# Patient Record
Sex: MALE | Race: WHITE | NOT HISPANIC OR LATINO | Employment: OTHER
[De-identification: names, ages, dates, MRNs, and addresses within clinical notes are randomized per-mention and may not be internally consistent; named-entity substitution may affect disease eponyms.]

---

## 2017-03-29 ENCOUNTER — RECORDS - HEALTHEAST (OUTPATIENT)
Dept: ADMINISTRATIVE | Facility: OTHER | Age: 62
End: 2017-03-29

## 2017-07-17 ENCOUNTER — OFFICE VISIT - HEALTHEAST (OUTPATIENT)
Dept: FAMILY MEDICINE | Facility: CLINIC | Age: 62
End: 2017-07-17

## 2017-07-17 DIAGNOSIS — K21.9 ESOPHAGEAL REFLUX: ICD-10-CM

## 2017-07-17 DIAGNOSIS — R73.9 HYPERGLYCEMIA: ICD-10-CM

## 2017-07-17 DIAGNOSIS — Z00.00 ROUTINE GENERAL MEDICAL EXAMINATION AT A HEALTH CARE FACILITY: ICD-10-CM

## 2017-07-17 DIAGNOSIS — N50.89 TESTICULAR SWELLING, RIGHT: ICD-10-CM

## 2017-07-17 DIAGNOSIS — R79.89 ELEVATED LFTS: ICD-10-CM

## 2017-07-17 LAB
CHOLEST SERPL-MCNC: 198 MG/DL
FASTING STATUS PATIENT QL REPORTED: YES
HBA1C MFR BLD: 6 % (ref 3.5–6)
HDLC SERPL-MCNC: 59 MG/DL
LDLC SERPL CALC-MCNC: 122 MG/DL
PSA SERPL-MCNC: 0.5 NG/ML (ref 0–4.5)
TRIGL SERPL-MCNC: 83 MG/DL

## 2017-07-17 ASSESSMENT — MIFFLIN-ST. JEOR: SCORE: 1697.08

## 2017-07-25 ENCOUNTER — COMMUNICATION - HEALTHEAST (OUTPATIENT)
Dept: LAB | Facility: CLINIC | Age: 62
End: 2017-07-25

## 2017-07-25 DIAGNOSIS — E87.5 HYPERKALEMIA: ICD-10-CM

## 2017-07-26 ENCOUNTER — AMBULATORY - HEALTHEAST (OUTPATIENT)
Dept: LAB | Facility: CLINIC | Age: 62
End: 2017-07-26

## 2017-07-26 DIAGNOSIS — E87.5 HYPERKALEMIA: ICD-10-CM

## 2017-08-01 ENCOUNTER — RECORDS - HEALTHEAST (OUTPATIENT)
Dept: ADMINISTRATIVE | Facility: OTHER | Age: 62
End: 2017-08-01

## 2018-03-05 ENCOUNTER — OFFICE VISIT - HEALTHEAST (OUTPATIENT)
Dept: FAMILY MEDICINE | Facility: CLINIC | Age: 63
End: 2018-03-05

## 2018-03-05 DIAGNOSIS — N43.3 RIGHT HYDROCELE: ICD-10-CM

## 2018-03-05 DIAGNOSIS — Z01.818 PREOPERATIVE EXAMINATION: ICD-10-CM

## 2018-03-05 LAB
ATRIAL RATE - MUSE: 63 BPM
DIASTOLIC BLOOD PRESSURE - MUSE: NORMAL MMHG
HGB BLD-MCNC: 15.3 G/DL (ref 14–18)
INTERPRETATION ECG - MUSE: NORMAL
P AXIS - MUSE: 60 DEGREES
PR INTERVAL - MUSE: 130 MS
QRS DURATION - MUSE: 96 MS
QT - MUSE: 380 MS
QTC - MUSE: 388 MS
R AXIS - MUSE: 29 DEGREES
SYSTOLIC BLOOD PRESSURE - MUSE: NORMAL MMHG
T AXIS - MUSE: 21 DEGREES
VENTRICULAR RATE- MUSE: 63 BPM

## 2018-03-05 ASSESSMENT — MIFFLIN-ST. JEOR: SCORE: 1700.25

## 2018-03-08 ASSESSMENT — MIFFLIN-ST. JEOR: SCORE: 1659.43

## 2018-03-11 ENCOUNTER — ANESTHESIA - HEALTHEAST (OUTPATIENT)
Dept: SURGERY | Facility: CLINIC | Age: 63
End: 2018-03-11

## 2018-03-12 ENCOUNTER — SURGERY - HEALTHEAST (OUTPATIENT)
Dept: SURGERY | Facility: CLINIC | Age: 63
End: 2018-03-12

## 2018-06-18 ENCOUNTER — OFFICE VISIT - HEALTHEAST (OUTPATIENT)
Dept: FAMILY MEDICINE | Facility: CLINIC | Age: 63
End: 2018-06-18

## 2018-06-18 DIAGNOSIS — Z00.00 ROUTINE GENERAL MEDICAL EXAMINATION AT A HEALTH CARE FACILITY: ICD-10-CM

## 2018-06-18 DIAGNOSIS — R73.9 HYPERGLYCEMIA: ICD-10-CM

## 2018-06-18 DIAGNOSIS — R53.83 FATIGUE: ICD-10-CM

## 2018-06-18 LAB
ALBUMIN SERPL-MCNC: 3.8 G/DL (ref 3.5–5)
ALP SERPL-CCNC: 71 U/L (ref 45–120)
ALT SERPL W P-5'-P-CCNC: 14 U/L (ref 0–45)
ANION GAP SERPL CALCULATED.3IONS-SCNC: 9 MMOL/L (ref 5–18)
AST SERPL W P-5'-P-CCNC: 14 U/L (ref 0–40)
BILIRUB SERPL-MCNC: 0.5 MG/DL (ref 0–1)
BUN SERPL-MCNC: 13 MG/DL (ref 8–22)
CALCIUM SERPL-MCNC: 9.6 MG/DL (ref 8.5–10.5)
CHLORIDE BLD-SCNC: 107 MMOL/L (ref 98–107)
CHOLEST SERPL-MCNC: 185 MG/DL
CO2 SERPL-SCNC: 26 MMOL/L (ref 22–31)
CREAT SERPL-MCNC: 0.9 MG/DL (ref 0.7–1.3)
ERYTHROCYTE [DISTWIDTH] IN BLOOD BY AUTOMATED COUNT: 12.9 % (ref 11–14.5)
FASTING STATUS PATIENT QL REPORTED: YES
GFR SERPL CREATININE-BSD FRML MDRD: >60 ML/MIN/1.73M2
GLUCOSE BLD-MCNC: 101 MG/DL (ref 70–125)
HBA1C MFR BLD: 5.9 % (ref 3.5–6)
HCT VFR BLD AUTO: 45.8 % (ref 40–54)
HDLC SERPL-MCNC: 51 MG/DL
HGB BLD-MCNC: 15.6 G/DL (ref 14–18)
LDLC SERPL CALC-MCNC: 119 MG/DL
MCH RBC QN AUTO: 32 PG (ref 27–34)
MCHC RBC AUTO-ENTMCNC: 34 G/DL (ref 32–36)
MCV RBC AUTO: 94 FL (ref 80–100)
PLATELET # BLD AUTO: 300 THOU/UL (ref 140–440)
PMV BLD AUTO: 6.9 FL (ref 7–10)
POTASSIUM BLD-SCNC: 5.3 MMOL/L (ref 3.5–5)
PROT SERPL-MCNC: 6.6 G/DL (ref 6–8)
PSA SERPL-MCNC: 0.5 NG/ML (ref 0–4.5)
RBC # BLD AUTO: 4.86 MILL/UL (ref 4.4–6.2)
SODIUM SERPL-SCNC: 142 MMOL/L (ref 136–145)
TRIGL SERPL-MCNC: 74 MG/DL
WBC: 6.3 THOU/UL (ref 4–11)

## 2018-06-18 ASSESSMENT — MIFFLIN-ST. JEOR: SCORE: 1695.72

## 2018-10-01 ENCOUNTER — AMBULATORY - HEALTHEAST (OUTPATIENT)
Dept: FAMILY MEDICINE | Facility: CLINIC | Age: 63
End: 2018-10-01

## 2018-10-02 ENCOUNTER — AMBULATORY - HEALTHEAST (OUTPATIENT)
Dept: NURSING | Facility: CLINIC | Age: 63
End: 2018-10-02

## 2018-10-09 ENCOUNTER — OFFICE VISIT - HEALTHEAST (OUTPATIENT)
Dept: FAMILY MEDICINE | Facility: CLINIC | Age: 63
End: 2018-10-09

## 2018-10-09 DIAGNOSIS — R10.13 ABDOMINAL PAIN, EPIGASTRIC: ICD-10-CM

## 2018-10-09 LAB
ALBUMIN SERPL-MCNC: 3.9 G/DL (ref 3.5–5)
ALP SERPL-CCNC: 148 U/L (ref 45–120)
ALT SERPL W P-5'-P-CCNC: 178 U/L (ref 0–45)
ANION GAP SERPL CALCULATED.3IONS-SCNC: 10 MMOL/L (ref 5–18)
AST SERPL W P-5'-P-CCNC: 47 U/L (ref 0–40)
BILIRUB SERPL-MCNC: 0.6 MG/DL (ref 0–1)
BUN SERPL-MCNC: 11 MG/DL (ref 8–22)
CALCIUM SERPL-MCNC: 9.6 MG/DL (ref 8.5–10.5)
CHLORIDE BLD-SCNC: 107 MMOL/L (ref 98–107)
CO2 SERPL-SCNC: 26 MMOL/L (ref 22–31)
CREAT SERPL-MCNC: 0.92 MG/DL (ref 0.7–1.3)
ERYTHROCYTE [DISTWIDTH] IN BLOOD BY AUTOMATED COUNT: 12.6 % (ref 11–14.5)
GFR SERPL CREATININE-BSD FRML MDRD: >60 ML/MIN/1.73M2
GLUCOSE BLD-MCNC: 93 MG/DL (ref 70–125)
HCT VFR BLD AUTO: 43.4 % (ref 40–54)
HGB BLD-MCNC: 14.7 G/DL (ref 14–18)
LIPASE SERPL-CCNC: <9 U/L (ref 0–52)
MCH RBC QN AUTO: 31.4 PG (ref 27–34)
MCHC RBC AUTO-ENTMCNC: 33.9 G/DL (ref 32–36)
MCV RBC AUTO: 93 FL (ref 80–100)
PLATELET # BLD AUTO: 308 THOU/UL (ref 140–440)
PMV BLD AUTO: 6.8 FL (ref 7–10)
POTASSIUM BLD-SCNC: 4.3 MMOL/L (ref 3.5–5)
PROT SERPL-MCNC: 6.7 G/DL (ref 6–8)
RBC # BLD AUTO: 4.68 MILL/UL (ref 4.4–6.2)
SODIUM SERPL-SCNC: 143 MMOL/L (ref 136–145)
WBC: 7.4 THOU/UL (ref 4–11)

## 2018-10-10 ENCOUNTER — HOSPITAL ENCOUNTER (OUTPATIENT)
Dept: ULTRASOUND IMAGING | Facility: CLINIC | Age: 63
Discharge: HOME | End: 2018-10-10
Attending: FAMILY MEDICINE

## 2018-10-10 DIAGNOSIS — R10.13 ABDOMINAL PAIN, EPIGASTRIC: ICD-10-CM

## 2018-10-29 ENCOUNTER — COMMUNICATION - HEALTHEAST (OUTPATIENT)
Dept: LAB | Facility: CLINIC | Age: 63
End: 2018-10-29

## 2018-10-29 DIAGNOSIS — R79.89 ELEVATED LFTS: ICD-10-CM

## 2018-10-30 ENCOUNTER — AMBULATORY - HEALTHEAST (OUTPATIENT)
Dept: NURSING | Facility: CLINIC | Age: 63
End: 2018-10-30

## 2018-10-30 ENCOUNTER — AMBULATORY - HEALTHEAST (OUTPATIENT)
Dept: LAB | Facility: CLINIC | Age: 63
End: 2018-10-30

## 2018-10-30 DIAGNOSIS — R79.89 ELEVATED LFTS: ICD-10-CM

## 2018-10-30 LAB
ALBUMIN SERPL-MCNC: 4 G/DL (ref 3.5–5)
ALP SERPL-CCNC: 78 U/L (ref 45–120)
ALT SERPL W P-5'-P-CCNC: 17 U/L (ref 0–45)
AST SERPL W P-5'-P-CCNC: 15 U/L (ref 0–40)
BILIRUB DIRECT SERPL-MCNC: 0.2 MG/DL
BILIRUB SERPL-MCNC: 0.8 MG/DL (ref 0–1)
PROT SERPL-MCNC: 6.8 G/DL (ref 6–8)

## 2018-11-19 ENCOUNTER — COMMUNICATION - HEALTHEAST (OUTPATIENT)
Dept: SCHEDULING | Facility: CLINIC | Age: 63
End: 2018-11-19

## 2019-06-17 ENCOUNTER — OFFICE VISIT - HEALTHEAST (OUTPATIENT)
Dept: FAMILY MEDICINE | Facility: CLINIC | Age: 64
End: 2019-06-17

## 2019-06-17 DIAGNOSIS — R10.11 RUQ ABDOMINAL PAIN: ICD-10-CM

## 2019-06-17 LAB
ALBUMIN SERPL-MCNC: 4 G/DL (ref 3.5–5)
ALP SERPL-CCNC: 70 U/L (ref 45–120)
ALT SERPL W P-5'-P-CCNC: 19 U/L (ref 0–45)
ANION GAP SERPL CALCULATED.3IONS-SCNC: 6 MMOL/L (ref 5–18)
AST SERPL W P-5'-P-CCNC: 15 U/L (ref 0–40)
BASOPHILS # BLD AUTO: 0 THOU/UL (ref 0–0.2)
BASOPHILS NFR BLD AUTO: 1 % (ref 0–2)
BILIRUB SERPL-MCNC: 0.5 MG/DL (ref 0–1)
BUN SERPL-MCNC: 17 MG/DL (ref 8–22)
CALCIUM SERPL-MCNC: 10.4 MG/DL (ref 8.5–10.5)
CHLORIDE BLD-SCNC: 105 MMOL/L (ref 98–107)
CO2 SERPL-SCNC: 31 MMOL/L (ref 22–31)
CREAT SERPL-MCNC: 1.05 MG/DL (ref 0.7–1.3)
EOSINOPHIL # BLD AUTO: 0.8 THOU/UL (ref 0–0.4)
EOSINOPHIL NFR BLD AUTO: 11 % (ref 0–6)
ERYTHROCYTE [DISTWIDTH] IN BLOOD BY AUTOMATED COUNT: 12.8 % (ref 11–14.5)
GFR SERPL CREATININE-BSD FRML MDRD: >60 ML/MIN/1.73M2
GLUCOSE BLD-MCNC: 108 MG/DL (ref 70–125)
HCT VFR BLD AUTO: 48.1 % (ref 40–54)
HGB BLD-MCNC: 15.9 G/DL (ref 14–18)
LIPASE SERPL-CCNC: <9 U/L (ref 0–52)
LYMPHOCYTES # BLD AUTO: 1.1 THOU/UL (ref 0.8–4.4)
LYMPHOCYTES NFR BLD AUTO: 16 % (ref 20–40)
MCH RBC QN AUTO: 31.4 PG (ref 27–34)
MCHC RBC AUTO-ENTMCNC: 33.1 G/DL (ref 32–36)
MCV RBC AUTO: 95 FL (ref 80–100)
MONOCYTES # BLD AUTO: 0.4 THOU/UL (ref 0–0.9)
MONOCYTES NFR BLD AUTO: 6 % (ref 2–10)
NEUTROPHILS # BLD AUTO: 4.4 THOU/UL (ref 2–7.7)
NEUTROPHILS NFR BLD AUTO: 66 % (ref 50–70)
PLATELET # BLD AUTO: 318 THOU/UL (ref 140–440)
PMV BLD AUTO: 6.9 FL (ref 7–10)
POTASSIUM BLD-SCNC: 5.5 MMOL/L (ref 3.5–5)
PROT SERPL-MCNC: 7 G/DL (ref 6–8)
RBC # BLD AUTO: 5.06 MILL/UL (ref 4.4–6.2)
SODIUM SERPL-SCNC: 142 MMOL/L (ref 136–145)
WBC: 6.7 THOU/UL (ref 4–11)

## 2019-06-27 ENCOUNTER — HOSPITAL ENCOUNTER (OUTPATIENT)
Dept: ULTRASOUND IMAGING | Facility: CLINIC | Age: 64
Discharge: HOME | End: 2019-06-27
Attending: FAMILY MEDICINE

## 2019-06-27 DIAGNOSIS — R10.11 RUQ ABDOMINAL PAIN: ICD-10-CM

## 2019-06-30 ENCOUNTER — AMBULATORY - HEALTHEAST (OUTPATIENT)
Dept: FAMILY MEDICINE | Facility: CLINIC | Age: 64
End: 2019-06-30

## 2019-06-30 DIAGNOSIS — R10.11 RUQ ABDOMINAL PAIN: ICD-10-CM

## 2019-07-08 ENCOUNTER — AMBULATORY - HEALTHEAST (OUTPATIENT)
Dept: FAMILY MEDICINE | Facility: CLINIC | Age: 64
End: 2019-07-08

## 2019-07-08 ENCOUNTER — COMMUNICATION - HEALTHEAST (OUTPATIENT)
Dept: LAB | Facility: CLINIC | Age: 64
End: 2019-07-08

## 2019-07-08 DIAGNOSIS — E87.5 HYPERKALEMIA: ICD-10-CM

## 2019-07-10 ENCOUNTER — AMBULATORY - HEALTHEAST (OUTPATIENT)
Dept: LAB | Facility: CLINIC | Age: 64
End: 2019-07-10

## 2019-07-10 DIAGNOSIS — E87.5 HYPERKALEMIA: ICD-10-CM

## 2019-07-10 LAB — POTASSIUM BLD-SCNC: 4.8 MMOL/L (ref 3.5–5)

## 2019-07-24 ENCOUNTER — RECORDS - HEALTHEAST (OUTPATIENT)
Dept: ADMINISTRATIVE | Facility: OTHER | Age: 64
End: 2019-07-24

## 2019-07-29 ENCOUNTER — HOSPITAL ENCOUNTER (OUTPATIENT)
Dept: CT IMAGING | Facility: CLINIC | Age: 64
Discharge: HOME | End: 2019-07-29

## 2019-07-29 DIAGNOSIS — R10.11 RUQ PAIN: ICD-10-CM

## 2019-07-29 LAB
CREAT BLD-MCNC: 0.9 MG/DL (ref 0.7–1.3)
GFR SERPL CREATININE-BSD FRML MDRD: >60 ML/MIN/1.73M2

## 2019-10-08 ENCOUNTER — AMBULATORY - HEALTHEAST (OUTPATIENT)
Dept: NURSING | Facility: CLINIC | Age: 64
End: 2019-10-08

## 2019-10-08 DIAGNOSIS — Z00.00 HEALTHCARE MAINTENANCE: ICD-10-CM

## 2019-11-26 ENCOUNTER — RECORDS - HEALTHEAST (OUTPATIENT)
Dept: ADMINISTRATIVE | Facility: OTHER | Age: 64
End: 2019-11-26

## 2020-02-03 ENCOUNTER — RECORDS - HEALTHEAST (OUTPATIENT)
Dept: ADMINISTRATIVE | Facility: OTHER | Age: 65
End: 2020-02-03

## 2020-02-11 ENCOUNTER — OFFICE VISIT - HEALTHEAST (OUTPATIENT)
Dept: FAMILY MEDICINE | Facility: CLINIC | Age: 65
End: 2020-02-11

## 2020-02-11 DIAGNOSIS — H91.90 HEARING LOSS, UNSPECIFIED HEARING LOSS TYPE, UNSPECIFIED LATERALITY: ICD-10-CM

## 2020-02-11 DIAGNOSIS — Z00.01 ENCOUNTER FOR GENERAL ADULT MEDICAL EXAMINATION WITH ABNORMAL FINDINGS: ICD-10-CM

## 2020-02-11 DIAGNOSIS — R10.11 RUQ ABDOMINAL PAIN: ICD-10-CM

## 2020-02-11 DIAGNOSIS — R07.89 CHEST WALL PAIN: ICD-10-CM

## 2020-02-11 DIAGNOSIS — Z13.220 LIPID SCREENING: ICD-10-CM

## 2020-02-11 DIAGNOSIS — Z12.5 SCREENING FOR PROSTATE CANCER: ICD-10-CM

## 2020-02-11 DIAGNOSIS — R73.9 HYPERGLYCEMIA: ICD-10-CM

## 2020-02-11 DIAGNOSIS — R03.0 PREHYPERTENSION: ICD-10-CM

## 2020-02-11 LAB
ALBUMIN SERPL-MCNC: 4.2 G/DL (ref 3.5–5)
ALP SERPL-CCNC: 82 U/L (ref 45–120)
ALT SERPL W P-5'-P-CCNC: 23 U/L (ref 0–45)
ANION GAP SERPL CALCULATED.3IONS-SCNC: 9 MMOL/L (ref 5–18)
AST SERPL W P-5'-P-CCNC: 19 U/L (ref 0–40)
BILIRUB SERPL-MCNC: 0.7 MG/DL (ref 0–1)
BUN SERPL-MCNC: 13 MG/DL (ref 8–22)
CALCIUM SERPL-MCNC: 9.8 MG/DL (ref 8.5–10.5)
CHLORIDE BLD-SCNC: 107 MMOL/L (ref 98–107)
CHOLEST SERPL-MCNC: 170 MG/DL
CO2 SERPL-SCNC: 29 MMOL/L (ref 22–31)
CREAT SERPL-MCNC: 0.92 MG/DL (ref 0.7–1.3)
FASTING STATUS PATIENT QL REPORTED: YES
GFR SERPL CREATININE-BSD FRML MDRD: >60 ML/MIN/1.73M2
GLUCOSE BLD-MCNC: 99 MG/DL (ref 70–125)
HBA1C MFR BLD: 5.8 % (ref 3.5–6)
HDLC SERPL-MCNC: 48 MG/DL
LDLC SERPL CALC-MCNC: 107 MG/DL
POTASSIUM BLD-SCNC: 4.3 MMOL/L (ref 3.5–5)
PROT SERPL-MCNC: 7.1 G/DL (ref 6–8)
PSA SERPL-MCNC: 1.8 NG/ML (ref 0–4.5)
SODIUM SERPL-SCNC: 145 MMOL/L (ref 136–145)
TRIGL SERPL-MCNC: 74 MG/DL

## 2020-02-11 ASSESSMENT — MIFFLIN-ST. JEOR: SCORE: 1686.64

## 2020-02-19 ENCOUNTER — OFFICE VISIT - HEALTHEAST (OUTPATIENT)
Dept: AUDIOLOGY | Facility: CLINIC | Age: 65
End: 2020-02-19

## 2020-02-19 DIAGNOSIS — H61.22 IMPACTED CERUMEN OF LEFT EAR: ICD-10-CM

## 2020-04-28 ENCOUNTER — COMMUNICATION - HEALTHEAST (OUTPATIENT)
Dept: FAMILY MEDICINE | Facility: CLINIC | Age: 65
End: 2020-04-28

## 2020-04-28 DIAGNOSIS — Z12.5 SCREENING FOR PROSTATE CANCER: ICD-10-CM

## 2020-05-14 ENCOUNTER — COMMUNICATION - HEALTHEAST (OUTPATIENT)
Dept: FAMILY MEDICINE | Facility: CLINIC | Age: 65
End: 2020-05-14

## 2020-05-14 DIAGNOSIS — Z12.5 SCREENING FOR PROSTATE CANCER: ICD-10-CM

## 2020-05-26 ENCOUNTER — AMBULATORY - HEALTHEAST (OUTPATIENT)
Dept: LAB | Facility: CLINIC | Age: 65
End: 2020-05-26

## 2020-05-26 DIAGNOSIS — Z12.5 SCREENING FOR PROSTATE CANCER: ICD-10-CM

## 2020-05-26 LAB — PSA SERPL-MCNC: 1.9 NG/ML (ref 0–4.5)

## 2020-08-19 ENCOUNTER — RECORDS - HEALTHEAST (OUTPATIENT)
Dept: ADMINISTRATIVE | Facility: OTHER | Age: 65
End: 2020-08-19

## 2020-09-21 ENCOUNTER — OFFICE VISIT - HEALTHEAST (OUTPATIENT)
Dept: FAMILY MEDICINE | Facility: CLINIC | Age: 65
End: 2020-09-21

## 2020-09-21 DIAGNOSIS — M75.101 TEAR OF RIGHT ROTATOR CUFF, UNSPECIFIED TEAR EXTENT, UNSPECIFIED WHETHER TRAUMATIC: ICD-10-CM

## 2020-09-21 DIAGNOSIS — Z23 FLU VACCINE NEED: ICD-10-CM

## 2020-09-21 DIAGNOSIS — R73.9 HYPERGLYCEMIA: ICD-10-CM

## 2020-09-21 DIAGNOSIS — Z01.818 PREOP GENERAL PHYSICAL EXAM: ICD-10-CM

## 2020-09-21 LAB
ERYTHROCYTE [DISTWIDTH] IN BLOOD BY AUTOMATED COUNT: 12.2 % (ref 11–14.5)
HCT VFR BLD AUTO: 44.4 % (ref 40–54)
HGB BLD-MCNC: 15 G/DL (ref 14–18)
MCH RBC QN AUTO: 32.2 PG (ref 27–34)
MCHC RBC AUTO-ENTMCNC: 33.7 G/DL (ref 32–36)
MCV RBC AUTO: 95 FL (ref 80–100)
PLATELET # BLD AUTO: 310 THOU/UL (ref 140–440)
PMV BLD AUTO: 6.7 FL (ref 7–10)
RBC # BLD AUTO: 4.65 MILL/UL (ref 4.4–6.2)
WBC: 7.2 THOU/UL (ref 4–11)

## 2020-09-21 ASSESSMENT — MIFFLIN-ST. JEOR: SCORE: 1682.11

## 2020-10-08 ENCOUNTER — RECORDS - HEALTHEAST (OUTPATIENT)
Dept: ADMINISTRATIVE | Facility: OTHER | Age: 65
End: 2020-10-08

## 2020-10-21 ENCOUNTER — RECORDS - HEALTHEAST (OUTPATIENT)
Dept: ADMINISTRATIVE | Facility: OTHER | Age: 65
End: 2020-10-21

## 2020-11-18 ENCOUNTER — RECORDS - HEALTHEAST (OUTPATIENT)
Dept: ADMINISTRATIVE | Facility: OTHER | Age: 65
End: 2020-11-18

## 2020-12-30 ENCOUNTER — RECORDS - HEALTHEAST (OUTPATIENT)
Dept: ADMINISTRATIVE | Facility: OTHER | Age: 65
End: 2020-12-30

## 2021-01-08 ENCOUNTER — OFFICE VISIT - HEALTHEAST (OUTPATIENT)
Dept: FAMILY MEDICINE | Facility: CLINIC | Age: 66
End: 2021-01-08

## 2021-01-08 DIAGNOSIS — I73.00 RAYNAUD'S PHENOMENON WITHOUT GANGRENE: ICD-10-CM

## 2021-01-08 DIAGNOSIS — Z12.5 ENCOUNTER FOR SCREENING FOR MALIGNANT NEOPLASM OF PROSTATE: ICD-10-CM

## 2021-01-08 DIAGNOSIS — B35.6 TINEA CRURIS: ICD-10-CM

## 2021-01-08 DIAGNOSIS — D89.89 OTHER SPECIFIED DISORDERS INVOLVING THE IMMUNE MECHANISM, NOT ELSEWHERE CLASSIFIED (H): ICD-10-CM

## 2021-01-08 LAB
ALBUMIN SERPL-MCNC: 4.3 G/DL (ref 3.5–5)
ALP SERPL-CCNC: 66 U/L (ref 45–120)
ALT SERPL W P-5'-P-CCNC: 16 U/L (ref 0–45)
ANION GAP SERPL CALCULATED.3IONS-SCNC: 7 MMOL/L (ref 5–18)
AST SERPL W P-5'-P-CCNC: 15 U/L (ref 0–40)
BILIRUB SERPL-MCNC: 0.7 MG/DL (ref 0–1)
BUN SERPL-MCNC: 12 MG/DL (ref 8–22)
CALCIUM SERPL-MCNC: 9.6 MG/DL (ref 8.5–10.5)
CHLORIDE BLD-SCNC: 105 MMOL/L (ref 98–107)
CO2 SERPL-SCNC: 31 MMOL/L (ref 22–31)
CREAT SERPL-MCNC: 0.89 MG/DL (ref 0.7–1.3)
ERYTHROCYTE [DISTWIDTH] IN BLOOD BY AUTOMATED COUNT: 12.2 % (ref 11–14.5)
GFR SERPL CREATININE-BSD FRML MDRD: >60 ML/MIN/1.73M2
GLUCOSE BLD-MCNC: 97 MG/DL (ref 70–125)
HCT VFR BLD AUTO: 45 % (ref 40–54)
HGB BLD-MCNC: 15.1 G/DL (ref 14–18)
MCH RBC QN AUTO: 31.7 PG (ref 27–34)
MCHC RBC AUTO-ENTMCNC: 33.7 G/DL (ref 32–36)
MCV RBC AUTO: 94 FL (ref 80–100)
PLATELET # BLD AUTO: 318 THOU/UL (ref 140–440)
PMV BLD AUTO: 6.9 FL (ref 7–10)
POTASSIUM BLD-SCNC: 4.6 MMOL/L (ref 3.5–5)
PROT SERPL-MCNC: 7 G/DL (ref 6–8)
PSA SERPL-MCNC: 1.1 NG/ML (ref 0–4.5)
RBC # BLD AUTO: 4.78 MILL/UL (ref 4.4–6.2)
SODIUM SERPL-SCNC: 143 MMOL/L (ref 136–145)
TSH SERPL DL<=0.005 MIU/L-ACNC: 1.22 UIU/ML (ref 0.3–5)
WBC: 6.1 THOU/UL (ref 4–11)

## 2021-01-12 LAB — ANA SER QL: 0.8 U

## 2021-02-17 ENCOUNTER — RECORDS - HEALTHEAST (OUTPATIENT)
Dept: ADMINISTRATIVE | Facility: OTHER | Age: 66
End: 2021-02-17

## 2021-04-19 ENCOUNTER — OFFICE VISIT - HEALTHEAST (OUTPATIENT)
Dept: FAMILY MEDICINE | Facility: CLINIC | Age: 66
End: 2021-04-19

## 2021-04-19 DIAGNOSIS — E78.5 DYSLIPIDEMIA: ICD-10-CM

## 2021-04-19 DIAGNOSIS — Z00.01 ENCOUNTER FOR GENERAL ADULT MEDICAL EXAMINATION WITH ABNORMAL FINDINGS: ICD-10-CM

## 2021-04-19 DIAGNOSIS — Z12.5 SCREENING FOR PROSTATE CANCER: ICD-10-CM

## 2021-04-19 DIAGNOSIS — Z00.00 ROUTINE GENERAL MEDICAL EXAMINATION AT A HEALTH CARE FACILITY: ICD-10-CM

## 2021-04-19 DIAGNOSIS — R73.9 HYPERGLYCEMIA: ICD-10-CM

## 2021-04-19 DIAGNOSIS — R63.4 WEIGHT LOSS: ICD-10-CM

## 2021-04-19 LAB
ALBUMIN SERPL-MCNC: 4.2 G/DL (ref 3.5–5)
ALP SERPL-CCNC: 63 U/L (ref 45–120)
ALT SERPL W P-5'-P-CCNC: 19 U/L (ref 0–45)
ANION GAP SERPL CALCULATED.3IONS-SCNC: 9 MMOL/L (ref 5–18)
AST SERPL W P-5'-P-CCNC: 16 U/L (ref 0–40)
BILIRUB SERPL-MCNC: 0.7 MG/DL (ref 0–1)
BUN SERPL-MCNC: 13 MG/DL (ref 8–22)
CALCIUM SERPL-MCNC: 9.2 MG/DL (ref 8.5–10.5)
CHLORIDE BLD-SCNC: 106 MMOL/L (ref 98–107)
CHOLEST SERPL-MCNC: 194 MG/DL
CO2 SERPL-SCNC: 27 MMOL/L (ref 22–31)
CREAT SERPL-MCNC: 0.82 MG/DL (ref 0.7–1.3)
FASTING STATUS PATIENT QL REPORTED: YES
GFR SERPL CREATININE-BSD FRML MDRD: >60 ML/MIN/1.73M2
GLUCOSE BLD-MCNC: 97 MG/DL (ref 70–125)
HBA1C MFR BLD: 5.8 %
HDLC SERPL-MCNC: 49 MG/DL
LDLC SERPL CALC-MCNC: 123 MG/DL
POTASSIUM BLD-SCNC: 4.6 MMOL/L (ref 3.5–5)
PROT SERPL-MCNC: 6.8 G/DL (ref 6–8)
PSA SERPL-MCNC: 0.5 NG/ML (ref 0–4.5)
SODIUM SERPL-SCNC: 142 MMOL/L (ref 136–145)
TRIGL SERPL-MCNC: 111 MG/DL
TSH SERPL DL<=0.005 MIU/L-ACNC: 1.47 UIU/ML (ref 0.3–5)

## 2021-04-19 ASSESSMENT — MIFFLIN-ST. JEOR: SCORE: 1665.78

## 2021-04-21 LAB — HCV AB SERPL QL IA: NEGATIVE

## 2021-05-29 NOTE — PROGRESS NOTES
Patient ID: Chirag Gomez is a 63 y.o. male.  /78   Pulse 60   Wt 195 lb 11.2 oz (88.8 kg)   SpO2 99%   BMI 27.29 kg/m      Assessment/Plan:                   Diagnoses and all orders for this visit:    RUQ abdominal pain  -     Comprehensive Metabolic Panel  -     HM1(CBC and Differential)  -     Lipase  -     US Abdomen Limited; Future; Expected date: 06/17/2019  -     HM1 (CBC with Diff)          DISCUSSION  Suspect that he may have duct stones that he passes on occasion.  We have never had confirmation of this.  Given the intermittent nature pain seems less likely that this would be a significant life-threatening process and reassurance is provided.  Obtain labs as above.  Check ultrasound.  Consider referral to gastroenterology.  Remain on proton pump inhibitor.  Consider additional evaluation.  Subjective:     HPI    Chirag Gomez is a 63 y.o. male is here today concerned regarding ongoing intermittent bouts of right upper quadrant abdominal pain.  In 2016 had his gallbladder removed.  He came to see me back in October 2018 after a significant bout of right upper quadrant pain.  We obtain laboratory test that showed an elevation in ALT to 178, AST to 47 and alkaline phosphatase to 148.  We sent her for an ultrasound that just showed he had his gallbladder removed without any significant abnormality.  I had him return in a couple of weeks and be repeated laboratory testing and everything had returned to a completely normal level at his usual baseline.  We did suspicion that he may have had a duct stone that had passed.  Patient reports that for a couple of months he had no issue but then began to experience some bouts of right upper quadrant pain.  None were as bad as this initial episode back in October 2018.  He is concerned about this.  Since scheduling his appointment now nearly a month ago he actually reports he has had really no bouts of pain.  He overall reports he feels well  without fevers chills night sweats abdominal pain nausea vomiting or diarrhea.  He does have known acid reflux symptoms and takes omeprazole with good symptom control.  He reports no dysphasia, hematemesis, hematochezia or melena.    Review of Systems  Complete review of systems is obtained.  Other than the specific considerations noted above complete review of systems is negative.          Objective:   Medications:  Current Outpatient Medications   Medication Sig     omeprazole (PRILOSEC) 20 MG capsule Take 20 mg by mouth daily before breakfast.       Allergies:  No Known Allergies    Tobacco:   reports that he has never smoked. He has never used smokeless tobacco.     Physical Exam          /78   Pulse 60   Wt 195 lb 11.2 oz (88.8 kg)   SpO2 99%   BMI 27.29 kg/m          General Appearance:    Alert, cooperative, no distress   Eyes:   No scleral icterus or conjunctival irritation       Lungs:     Clear to auscultation bilaterally, respirations unlabored, no wheezes or crackles   Heart:    Regular rate and rhythm,  No murmur   Abdomen:    Soft, no distention, no tenderness on palpation, no masses, no organomegaly     Extremities:  No edema, no joint swelling or redness, no evidence of any injuries   Neurologic:  On gross examination there is no motor or sensory deficit.  Patient walks with a normal gait

## 2021-05-30 NOTE — TELEPHONE ENCOUNTER
Patient has a lab appointment on Wednesday to have his k+ rechecked.  Please place orders.   Thank you!

## 2021-05-31 VITALS — WEIGHT: 197.3 LBS | HEIGHT: 71 IN | BODY MASS INDEX: 27.62 KG/M2

## 2021-06-01 VITALS — HEIGHT: 71 IN | WEIGHT: 198 LBS | BODY MASS INDEX: 27.72 KG/M2

## 2021-06-01 VITALS — WEIGHT: 189 LBS | BODY MASS INDEX: 26.46 KG/M2 | HEIGHT: 71 IN

## 2021-06-01 VITALS — WEIGHT: 197 LBS | BODY MASS INDEX: 27.58 KG/M2 | HEIGHT: 71 IN

## 2021-06-02 VITALS — WEIGHT: 199 LBS | BODY MASS INDEX: 27.75 KG/M2

## 2021-06-03 VITALS — BODY MASS INDEX: 27.29 KG/M2 | WEIGHT: 195.7 LBS

## 2021-06-04 VITALS
SYSTOLIC BLOOD PRESSURE: 140 MMHG | HEIGHT: 71 IN | RESPIRATION RATE: 20 BRPM | DIASTOLIC BLOOD PRESSURE: 84 MMHG | OXYGEN SATURATION: 97 % | TEMPERATURE: 97.8 F | BODY MASS INDEX: 27.3 KG/M2 | WEIGHT: 195 LBS | HEART RATE: 61 BPM

## 2021-06-05 VITALS
DIASTOLIC BLOOD PRESSURE: 84 MMHG | RESPIRATION RATE: 18 BRPM | HEIGHT: 71 IN | OXYGEN SATURATION: 98 % | WEIGHT: 194 LBS | HEART RATE: 80 BPM | SYSTOLIC BLOOD PRESSURE: 138 MMHG | BODY MASS INDEX: 27.16 KG/M2

## 2021-06-05 VITALS
HEART RATE: 65 BPM | DIASTOLIC BLOOD PRESSURE: 74 MMHG | HEIGHT: 71 IN | OXYGEN SATURATION: 97 % | SYSTOLIC BLOOD PRESSURE: 124 MMHG | WEIGHT: 190.4 LBS | BODY MASS INDEX: 26.65 KG/M2

## 2021-06-05 VITALS
OXYGEN SATURATION: 99 % | WEIGHT: 196.1 LBS | DIASTOLIC BLOOD PRESSURE: 82 MMHG | BODY MASS INDEX: 27.35 KG/M2 | HEART RATE: 80 BPM | SYSTOLIC BLOOD PRESSURE: 130 MMHG

## 2021-06-06 NOTE — PROGRESS NOTES
Audiology only; referred by George Oliver    History:  Mr. Gomez is here primarily to appease his spouse, who feels his preferred TV volume is too loud, and that he doesn't always hear what she says. He feels he does well hearing when on the phone and in social situations where noise is often present. He denied hearing loss, tinnitus, dizziness, otalgia, otorrhea, aural fullness, or recent illness. 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 He reported infrequent use of hearing protection when engaging in these activities.    Results:  Otoscopy was unremarkable in the right ear, but revealed a near complete cerumen occlusion in the left ear. Hearing testing was deferred until cerumen can be safely removed by a physician.      Recommendations:  Follow-up with ENT/PCP for cerumen management; hearing testing may be rescheduled once ears are clear, if desired. Mr. Gomez was uncertain whether cerumen removal was covered by his insurance or if a referral was required. Dr. Oliver will be informed of this situation, and Mr. Gomez requested a call from Dr. Oliver/his staff regarding his next steps. No charge appointment today.    Desiree Garzon, [facility]  [place] Licensed Audiologist [id number]           [Patient] : patient

## 2021-06-06 NOTE — PROGRESS NOTES
Assessment and Plan:     Chirag was seen today for welcome to medicare visit and blood pressure check.    Hyperglycemia  -     Comprehensive Metabolic Panel  -     Glycosylated Hemoglobin A1c    Screening for prostate cancer  -     PSA, Annual Screen (Prostatic-Specific Antigen)    Lipid screening  -     Lipid Cascade FASTING    Hearing loss, unspecified hearing loss type, unspecified laterality  -     Ambulatory referral to Audiology    Encounter for general adult medical examination with abnormal findings    Other orders  -     Pneumococcal conjugate vaccine 13-valent 6wks-17yrs; >50yrs        The patient's current medical problems were reviewed.    The following high BMI interventions were performed this visit: encouragement to exercise  The following health maintenance schedule was reviewed with the patient and provided in printed form in the after visit summary:   Health Maintenance   Topic Date Due     HEPATITIS C SCREENING  1955     HIV SCREENING  11/21/1969     ADVANCE CARE PLANNING  11/21/1972     MEDICARE ANNUAL WELLNESS VISIT  11/21/2019     FALL RISK ASSESSMENT  11/21/2019     PNEUMOCOCCAL IMMUNIZATION 65+ LOW/MEDIUM RISK (1 of 2 - PCV13) 11/21/2019     TD 18+ HE  03/09/2022     LIPID  06/18/2023     COLONOSCOPY  03/29/2027     INFLUENZA VACCINE RULE BASED  Completed     TDAP ADULT ONE TIME DOSE  Completed     ZOSTER VACCINES  Addressed        Subjective:   Chief Complaint: Chirag Gomez is an 64 y.o. male here for a Welcome to Medicare visit.   HPI: His blood pressures recently been more elevated.  Seems to be more isolated instances.  We discussed a process of using lifestyle modification as a means to improve blood pressure along with continued monitoring with a lengthy discussion regarding thresholds that would prompt a need for consideration of treatment.    Patient has a history of gallbladder removal with some recurrent pain over the course of this past summer.  There was some  question as to whether he may be passing common duct stones but this was never proven definitively.  He was seen by gastroenterology.  CT findings did show some thickening in the gallbladder fossa and the significance of this was uncertain.  Patient was informed of this by his gastroenterologist but made a decision not to pursue following up with his surgeon as had been recommended.  Patient reports no further episodes of pain and he does not wish to pursue this further.  We discussed lab test surveillance along with a low threshold for reevaluation if any further concerns develop.    Patient developed chest pain recently and was seen at the urgency room for evaluation.  Patient reports having soreness in his left side chest accompanied later by some more significant sharp pains.  Patient states this area was tender to the touch.  Later after his visit patient did develop bruising in the area of previous pain.  His evaluation was reviewed.  In retrospect this seems to be clearly a musculoskeletal consideration.  There is no ongoing concern to warrant any additional work-up at this time but we discussed chest pain and vascular disease in great depth based on this.    He does have a history of prediabetes based on A1c of 6.0 obtained in the past.  There have been improvement when last measured in 2018 we discussed continued surveillance.  We discussed prostate cancer screening in depth again today given his baseline PSA testing we will continue with this with caution to be used regarding any action that we take in this regard.  Reviewed previous colonoscopy report.  Discussed immunizations including obtaining a Prevnar 13 vaccination today.  Patient maintains he did receive both new angles vaccines but we have a record of only one.  We will try to find this information.    Review of Systems:   Please see above.  The rest of the review of systems are negative for all systems.    Patient Care Team:  George Oliver,  MD as PCP - General  George Oliver MD as Assigned PCP     Patient Active Problem List   Diagnosis     Esophageal reflux     Acute Bronchitis     Lumbar Radiculopathy     Cholecystitis     Elevated BP     Calculus of bile duct with acute cholecystitis without obstruction     Encounter for screening for malignant neoplasm of colon     Reflux esophagitis     Rectal polyp     Polyp of colon     Diaphragmatic hernia     Gastroesophageal reflux disease     Esophagitis     Stricture and stenosis of esophagus     Special screening for malignant neoplasms, colon     Diverticular disease of large intestine     Diverticulosis of large intestine without perforation or abscess without bleeding     Past Medical History:   Diagnosis Date     GERD (gastroesophageal reflux disease)       Past Surgical History:   Procedure Laterality Date     HYDROCELE EXCISION / REPAIR Right 3/12/2018    Procedure: RIGHT HYDROCELECTOMY;  Surgeon: Cristo Gaston MD;  Location: [facility];  Service:      LAPAROSCOPIC CHOLECYSTECTOMY N/A 12/6/2016    Procedure: CHOLECYSTECTOMY LAPAROSCOPIC;  Surgeon: Layton Dill MD;  Location: [facility];  Service:       No family history on file.   Social History     Socioeconomic History     Marital status:      Spouse name: Not on file     Number of children: Not on file     Years of education: Not on file     Highest education level: Not on file   Occupational History     Not on file   Social Needs     Financial resource strain: Not on file     Food insecurity:     Worry: Not on file     Inability: Not on file     Transportation needs:     Medical: Not on file     Non-medical: Not on file   Tobacco Use     Smoking status: Never Smoker     Smokeless tobacco: Never Used   Substance and Sexual Activity     Alcohol use: Yes     Alcohol/week: 12.0 standard drinks     Types: 12 Cans of beer per week     Drug use: No     Sexual activity: Not on file   Lifestyle     Physical activity:     " Days per week: Not on file     Minutes per session: Not on file     Stress: Not on file   Relationships     Social connections:     Talks on phone: Not on file     Gets together: Not on file     Attends Protestant service: Not on file     Active member of club or organization: Not on file     Attends meetings of clubs or organizations: Not on file     Relationship status: Not on file     Intimate partner violence:     Fear of current or ex partner: Not on file     Emotionally abused: Not on file     Physically abused: Not on file     Forced sexual activity: Not on file   Other Topics Concern     Not on file   Social History Narrative     Not on file       Current Outpatient Medications   Medication Sig Dispense Refill     omeprazole (PRILOSEC) 20 MG capsule Take 20 mg by mouth daily before breakfast.       No current facility-administered medications for this visit.       Objective:   Vital Signs:   Visit Vitals  /84   Pulse 61   Temp 97.8  F (36.6  C) (Oral)   Resp 20   Ht 5' 11\" (1.803 m)   Wt 195 lb (88.5 kg)   SpO2 97%   BMI 27.20 kg/m         EKG:  Previous cardiac testing reviewed do not feel an EKG is needed at this time.    VisionScreening:   Visual Acuity Screening    Right eye Left eye Both eyes   Without correction:      With correction: 20/20 20/20 20/16        PHYSICAL EXAM        General Appearance:    Alert, cooperative, no distress   Eyes:   No scleral icterus or conjunctival irritation       Ears:    Normal TM's and external ear canals, both ears   Throat:   Lips, mucosa, and tongue normal; teeth and gums normal   Neck:   Supple, symmetrical, trachea midline, no adenopathy;        thyroid:  No enlargement/tenderness/nodules   Lungs:     Clear to auscultation bilaterally, respirations unlabored, no wheezes or crackles   Heart:    Regular rate and rhythm,  No murmur   Abdomen:    Soft, no distention, no tenderness on palpation, no masses, no organomegaly     Extremities:  No edema, no joint " swelling or redness, no evidence of any injuries   Skin:  No concerning skin findings, no suspicious moles, no rashes   Neurologic:  On gross examination there is no motor or sensory deficit.  Patient walks with a normal gait     Genitalia:   Normal testicular anatomy, no inguinal hernias, no skin findings in the genital region   Rectal:    Normal tone, no hemorrhoids masses or other anal rectal findings, prostate has a smooth uniform consistency without nodules       Assessment Results 2/11/2020   Activities of Daily Living No help needed   Instrumental Activities of Daily Living No help needed   Mini Cog Total Score 5   Some recent data might be hidden     A Mini Cog score of 0-2 suggests the possibility of dementia, score of 3-5 suggests no dementia    Identified Health Risks:     The patient was provided with written information regarding signs of hearing loss.  Patient's advanced directive was discussed and I am comfortable with the patient's wishes.

## 2021-06-07 NOTE — TELEPHONE ENCOUNTER
Orders being requested: PSA/and possibly other labs such as A1C etc. When in the office for his physical in Feb, Dr. Oliver wanted him to f/u and do a PSA test.  Patient wants to schedule a lab visit at [facility], but do not see any orders for labs. Patient was reading his my chart and it told him to check back in 3 months and do the PSA lab test and thinks it is just to come into the lab and not a F/U with Dr. Oliver.  Reason service is needed/diagnosis: PSA per Dr. Oliver   When are orders needed by: ASAP  Where to send Orders: [facility]. After obtaining order, please help patient to schedule a lab visit. Thank you!  Okay to leave detailed message?  Yes

## 2021-06-11 NOTE — PROGRESS NOTES
" Patient ID: Chirag Gomez is a 61 y.o. male.  /76  Pulse 66  Ht 5' 11\" (1.803 m)  Wt 197 lb 4.8 oz (89.5 kg)  BMI 27.52 kg/m2    Assessment/Plan:                   Diagnoses and all orders for this visit:    Routine general medical examination at a health care facility  -     PSA, Annual Screen (Prostatic-Specific Antigen)    Esophageal reflux    Elevated LFTs  -     Comprehensive Metabolic Panel  -     Lipid Cascade FASTING    Hyperglycemia  -     Lipid Cascade FASTING  -     Glycosylated Hemoglobin A1c    Testicular swelling, right  -     Ambulatory referral to Urology         DISCUSSION  Obtain labs as above.  Switch to H2 blocker for acid reflux management.  Referral to urology for evaluation and consideration of treatment of hydrocele  Subjective:     HPI    Chirag Gomez is a 61-year-old man who is here today for a physical.  Generally has been healthy.  This past year developed cholecystitis and had his gallbladder removed.  He feels he is largely recovered.  He had some liver test abnormalities which have not completely normalized at the time of his discharge but had significantly improved.  He also was noted to have some hyperglycemia during the course of his acute illness.  An A1c was 5.6 at the time.  He does have a family history of diabetes and we discussed follow-up on this issue.  He has noted right-sided testicular swelling which has arisen in the last couple of months.  Generally it is not painful.  It does seem to be slowly increasing in size.  He otherwise feels well we reviewed routine health preventive measures.  He does have a history of acid reflux.  Has been on proton pump inhibitor therapy.  We discussed potential long-term side effects of the medication.  Going off of the medication has resulted in return of significant acid reflux symptoms.  Today we discussed trial of an H2 blocker, specifically ranitidine as a means to try and reduce and control symptoms without " the use of a proton pump inhibitor.  Social and family history are reviewed.      Review of Systems  Complete review of systems is obtained.  Other than the specific considerations noted above complete review of systems is negative.        Objective:   Medications:  Current Outpatient Prescriptions   Medication Sig     omeprazole (PRILOSEC) 20 MG capsule Take 20 mg by mouth daily before breakfast.       Allergies:  No Known Allergies    Tobacco:   reports that he has never smoked. He does not have any smokeless tobacco history on file.    HEALTH PREVENTION    General  Dental care: Discussed the importance of regular dental care.  Eye care: Discussed importance of routine eye exams for glaucoma screening      Wt Readings from Last 3 Encounters:   07/17/17 197 lb 4.8 oz (89.5 kg)   12/08/16 208 lb 7 oz (94.5 kg)   09/06/16 202 lb (91.6 kg)     Body mass index is 27.52 kg/(m^2).    The following high BMI interventions were performed this visit: encouragement to exercise    Cancer screening  Testicular cancer:is discussed and exam performed today  Skin cancer: Discussed sun burn prevention and self monitoring.  Colon cancer: Colon cancer screening is discussed.  Reviewed most recent colonoscopy noting he is due again in March 2027  Prostate cancer: Discussed utilization of PSA and digital rectal exam acknowledging controversies.      Cholesterol:   LDL Calculated (mg/dL)   Date Value   09/06/2016 72   03/09/2012 124      Blood Pressure:   BP Readings from Last 3 Encounters:   07/17/17 126/76   12/08/16 136/87   09/06/16 134/82     Immunization History   Administered Date(s) Administered     DT (pediatric) 01/18/2006     Influenza, inj, historic 11/07/2007, 10/01/2016     Influenza, seasonal,quad inj 36+ mos 10/09/2015     Influenza, seasonal,quad inj 6-35 mos 11/29/2012     Td, historic 01/18/2006     Tdap 03/09/2012     There are no preventive care reminders to display for this patient.     Physical Exam     /76  " Pulse 66  Ht 5' 11\" (1.803 m)  Wt 197 lb 4.8 oz (89.5 kg)  BMI 27.52 kg/m2    General Appearance:    Alert, cooperative, no distress, appears stated age   Head:    Normocephalic, without obvious abnormality, atraumatic   Eyes:    PERRL, conjunctiva/corneas clear, EOM's intact       Ears:    Normal TM's and external ear canals, both ears   Nose:   Nares normal, septum midline, mucosa normal, no drainage    or sinus tenderness   Throat:   Lips, mucosa, and tongue normal; teeth and gums normal   Neck:   Supple, symmetrical, trachea midline, no adenopathy;        thyroid:  No enlargement/tenderness/nodules   Lungs:     Clear to auscultation bilaterally, respirations unlabored   Chest wall/ Breast:    No tenderness or deformity   Heart:    Regular rate and rhythm, S1 and S2 normal, no murmur, rub   or gallop   Abdomen:     Soft, non-tender, bowel sounds active all four quadrants,     no masses, no organomegaly   Genitalia:   right-sided hydrocele no obvious superimposed hernia.  Nontender on exam no other significant abnormality    Rectal:   smooth uniform consistency of the prostate prostate is on the smaller side no nodules    Extremities:   Extremities normal, atraumatic, no cyanosis or edema   Pulses:   2+ and symmetric all extremities   Skin:   Skin color, texture, turgor normal, no rashes or lesions   Neurologic:   CNII-XII intact. Normal strength, sensation and reflexes       throughout                         "

## 2021-06-11 NOTE — PROGRESS NOTES
[facility]  [address]  [facility] [address]  Dept: [phone]  Dept Fax: [phone]  Primary Provider: Otoniel Calvillo MD  Pre-op Performing Provider: OTONIEL CALVILLO    PREOPERATIVE EVALUATION:  Today's date: 9/21/2020    Chirag Gomez is a 64 y.o. male who presents for a preoperative evaluation.    Surgical Information:  Rotator cuff  [facility]  10/8/2020  No flowsheet data found.  Fax number for surgical facility: Note does not need to be faxed, will be available electronically in Epic.  Type of Anesthesia Anticipated: General    Subjective     HPI related to upcoming procedure:     Chirag Gomez is a 64 y.o. male he sustained an injury to his right shoulder several months ago.  He has been evaluated by orthopedic surgery and found to have rotator cuff tear needing surgical treatment.    He has a history of prediabetes with most recent hemoglobin A1c 5.8.  He is generally active and healthy.  Only has taken medication for treatment of mild acid reflux symptoms.  He has no history of heart disease, vascular disease, lung disease or other significant concerns.    He has no prior history of adverse reactions to anesthesia.  He has no history of a blood clot or a bleeding disorder.  He reports no signs or symptoms of an acute illness.    No known family history of anesthesia reactions, blood clots or bleeding disorders.      RX monitoring program ([place]) reviewed:  reviewed - no concerns    See problem list for active medical problems.  Problems all longstanding and stable, except as noted/documented.  See ROS for pertinent symptoms related to these conditions.      Review of Systems  Constitutional, neuro, ENT, endocrine, pulmonary, cardiac, gastrointestinal, genitourinary, musculoskeletal, integument and psychiatric systems are negative, except as otherwise noted.      Patient Active Problem List    Diagnosis Date Noted     History of cholecystectomy 09/17/2020     Right  "flank pain 09/17/2020     Right upper quadrant pain 09/17/2020     Nonspecific abdominal pain 07/24/2019     Esophagitis 03/02/2018     Encounter for screening for malignant neoplasm of colon 03/29/2017     Rectal polyp 03/29/2017     Polyp of colon 03/29/2017     Diverticular disease of large intestine 03/29/2017     Diverticulosis of large intestine without perforation or abscess without bleeding 03/29/2017     Cholecystitis 12/06/2016     Elevated BP      Calculus of bile duct with acute cholecystitis without obstruction      Esophageal reflux      Acute Bronchitis      Lumbar Radiculopathy      Diaphragmatic hernia 06/29/2009     Gastroesophageal reflux disease 06/29/2009     Reflux esophagitis 05/05/2009     Stricture and stenosis of esophagus 05/05/2009     Special screening for malignant neoplasms, colon 02/17/2006     Past Medical History:   Diagnosis Date     GERD (gastroesophageal reflux disease)      Past Surgical History:   Procedure Laterality Date     HYDROCELE EXCISION / REPAIR Right 3/12/2018    Procedure: RIGHT HYDROCELECTOMY;  Surgeon: Cristo Gaston MD;  Location: [facility];  Service:      LAPAROSCOPIC CHOLECYSTECTOMY N/A 12/6/2016    Procedure: CHOLECYSTECTOMY LAPAROSCOPIC;  Surgeon: Layton Dill MD;  Location: [facility];  Service:      Current Outpatient Medications   Medication Sig Dispense Refill     omeprazole (PRILOSEC) 20 MG capsule Take 20 mg by mouth daily before breakfast.       No current facility-administered medications for this visit.        No Known Allergies    Social History     Tobacco Use     Smoking status: Never Smoker     Smokeless tobacco: Never Used   Substance Use Topics     Alcohol use: Yes     Alcohol/week: 12.0 standard drinks     Types: 12 Cans of beer per week      No family history on file.  Social History     Substance and Sexual Activity   Drug Use No        Objective   /84   Pulse 80   Resp 18   Ht 5' 11\" (1.803 m)   Wt 194 lb " (88 kg)   SpO2 98%   BMI 27.06 kg/m    Physical Exam    GENERAL APPEARANCE: healthy, alert and no distress     EYES: EOMI, PERRL     HENT: ear canals and TM's normal and nose and mouth without ulcers or lesions     NECK: no adenopathy, no asymmetry, masses, or scars and thyroid normal to palpation     RESP: lungs clear to auscultation - no rales, rhonchi or wheezes     CV: regular rates and rhythm, normal S1 S2, no S3 or S4 and no murmur, click or rub     ABDOMEN:  soft, nontender, no HSM or masses and bowel sounds normal     MS: extremities normal- no gross deformities noted, no evidence of inflammation in joints, FROM in all extremities.     SKIN: no suspicious lesions or rashes     NEURO: Normal strength and tone, sensory exam grossly normal, mentation intact and speech normal     PSYCH: mentation appears normal. and affect normal/bright     LYMPHATICS: No cervical adenopathy    Recent Labs   Lab Test 09/21/20  1305 02/11/20  0920 07/10/19  0857 06/17/19  0811   HGB 15.0  --   --  15.9     --   --  318   NA  --  145  --  142   K  --  4.3 4.8 5.5*   CREATININE  --  0.92  --  1.05      Lab Results   Component Value Date    HGBA1C 5.8 02/11/2020         PRE-OP Diagnostics:   Labs pending at this time. Results will be reviewed when available.  No EKG required, no history of coronary heart disease, significant arrhythmia, peripheral arterial disease or other structural heart disease.         Assessment & Plan       The proposed surgical procedure is considered HIGH risk.    REVISED CARDIAC RISK INDEX   The patient has the following serious cardiovascular risks for perioperative complications:  High risk surgery (>5% cardiac complication risk) = 1 point    INTERPRETATION: 1 point: Class II (low risk - 0.9% complication rate)    Chirag was seen today for pre-op exam and flu vaccine.    Diagnoses and all orders for this visit:    Preop general physical exam  -     HM2(CBC w/o Differential)    Tear of right  rotator cuff, unspecified tear extent, unspecified whether traumatic    Hyperglycemia      The patient has the following additional risks and recommendations for perioperative complications:     - No identified additional risk factors other than previously addressed     MEDICATION INSTRUCTIONS:  Patient is to take all scheduled medications on the day of surgery EXCEPT for modifications listed below:    RECOMMENDATION:  APPROVAL GIVEN to proceed with proposed procedure, without further diagnostic evaluation.    No follow-ups on file.    Signed Electronically by: George Oliver MD    Copy of this evaluation report is provided to requesting physician.

## 2021-06-14 NOTE — PROGRESS NOTES
Patient ID: Chirag Gomez is a 65 y.o. male.  /82   Pulse 80   Wt 196 lb 1.6 oz (89 kg)   SpO2 99%   BMI 27.35 kg/m      Assessment/Plan:                   Diagnoses and all orders for this visit:    Raynaud's phenomenon without gangrene  -     PSA, Annual Screen (Prostatic-Specific Antigen)  -     Comprehensive Metabolic Panel  -     HM2(CBC w/o Differential)  -     Thyroid Cascade  -     Antinuclear Antibody (VANDANA) Cascade    Tinea cruris  -     nystatin-triamcinolone (MYCOLOG) ointment; Apply topically 2 (two) times a day.  Dispense: 30 g; Refill: 0    Encounter for screening for malignant neoplasm of prostate   -     PSA, Annual Screen (Prostatic-Specific Antigen)    Other specified disorders involving the immune mechanism, not elsewhere classified (H)   -     Thyroid [place]        DISCUSSION  His symptoms and exam findings are consistent with Raynaud's phenomenon.  I cannot identify any other cause for his described symptoms.  We will obtain lab test to rule out other potential causes and/or evaluate for potential autoimmune concerns that may be connected.  Discussed ways of managing the phenomenon.    Obtain PSA test today.    Utilize topical nystatin and triamcinolone for treatment of the skin infection.    Subjective:     HPI    Chirag Gomez is a 65 y.o. male he is here today to discuss several concerns.    Patient reports that over approximately the past 2 months he has had a phenomenon where he gets extreme coldness in his hands.  He states that if it is cold outside or his hands get a little bit chilly they will get even colder.  Sometimes they turn blue, sometimes they feel like they are swollen.  He denies any pain numbness or tingling.  He reports that this happens in both hands.  He did discuss this with his orthopedic surgeon who performed his shoulder surgery and it was not felt that it was in any way connected.  Patient does note that his son has Raynaud's phenomenon, but no  other associated autoimmune disorder.  Patient has never had this phenomenon happen before.  He is not taking any medications beyond occasional use of omeprazole which he has used long-term.    He reports a rash in his groin area.  Consists of area of redness with some central clearing.  Has tried over-the-counter topical Lamisil which has been ineffective at resolving the rash which is somewhat itchy.    He also has had concern for elevation in PSA testing.  Baseline PSA was about 0.8, 2 recent tests have been 1.8 and 1.9 respectively representing an increase of this usual baseline.  Discussed close monitoring of his PSA including checking today.    Review of Systems  Complete review of systems is obtained.  Other than the specific considerations noted above complete review of systems is negative.          Objective:   Medications:  Current Outpatient Medications   Medication Sig     omeprazole (PRILOSEC) 20 MG capsule Take 20 mg by mouth daily before breakfast.     nystatin-triamcinolone (MYCOLOG) ointment Apply topically 2 (two) times a day.       Allergies:  No Known Allergies    Tobacco:   reports that he has never smoked. He has never used smokeless tobacco.     Physical Exam          /82   Pulse 80   Wt 196 lb 1.6 oz (89 kg)   SpO2 99%   BMI 27.35 kg/m          General: Patient alert no signs of distress    Examination of his hands reveals that they are cool to the touch of the fingertips, no distinct color change or swelling.  Palpable radial pulses bilaterally.  Sensation intact, strength intact.    Lesion the skin in the groin area reveals an area of redness with central clearing and papules around the rim.  A few scattered red papules otherwise noted.

## 2021-06-16 PROBLEM — K62.1 RECTAL POLYP: Status: ACTIVE | Noted: 2017-03-29

## 2021-06-16 PROBLEM — K57.30 DIVERTICULAR DISEASE OF LARGE INTESTINE: Status: ACTIVE | Noted: 2017-03-29

## 2021-06-16 PROBLEM — K20.90 ESOPHAGITIS: Status: ACTIVE | Noted: 2018-03-02

## 2021-06-16 PROBLEM — K63.5 POLYP OF COLON: Status: ACTIVE | Noted: 2017-03-29

## 2021-06-16 PROBLEM — Z12.11 ENCOUNTER FOR SCREENING FOR MALIGNANT NEOPLASM OF COLON: Status: ACTIVE | Noted: 2017-03-29

## 2021-06-16 PROBLEM — R10.9 NONSPECIFIC ABDOMINAL PAIN: Status: ACTIVE | Noted: 2019-07-24

## 2021-06-16 PROBLEM — R10.11 RIGHT UPPER QUADRANT PAIN: Status: ACTIVE | Noted: 2020-09-17

## 2021-06-16 PROBLEM — K57.30 DIVERTICULOSIS OF LARGE INTESTINE WITHOUT PERFORATION OR ABSCESS WITHOUT BLEEDING: Status: ACTIVE | Noted: 2017-03-29

## 2021-06-16 PROBLEM — R10.9 RIGHT FLANK PAIN: Status: ACTIVE | Noted: 2020-09-17

## 2021-06-16 PROBLEM — Z90.49 HISTORY OF CHOLECYSTECTOMY: Status: ACTIVE | Noted: 2020-09-17

## 2021-06-16 NOTE — ANESTHESIA POSTPROCEDURE EVALUATION
Patient: Chirag Gomez  RIGHT HYDROCELECTOMY  Anesthesia type: general    Patient location: Phase II Recovery  Last vitals:   Vitals:    03/12/18 1130   BP: 151/90   Pulse: 63   Resp: 16   Temp:    SpO2: 98%     Post vital signs: stable  Level of consciousness: awake, alert and responds to simple questions  Post-anesthesia pain: pain controlled  Post-anesthesia nausea and vomiting: no  Pulmonary: unassisted, return to baseline  Cardiovascular: stable and blood pressure at baseline  Hydration: adequate  Anesthetic events: no    QCDR Measures:  ASA# 11 - Angi-op Cardiac Arrest: ASA11B - Patient did NOT experience unanticipated cardiac arrest  ASA# 12 - Angi-op Mortality Rate: ASA12B - Patient did NOT die  ASA# 13 - PACU Re-Intubation Rate: ASA13B - Patient did NOT require a new airway mgmt  ASA# 10 - Composite Anes Safety: ASA10A - No serious adverse event    Additional Notes:

## 2021-06-16 NOTE — PROGRESS NOTES
Preoperative Exam    Scheduled Procedure: right hydrocele  Surgery Date:  3/12  Surgery Location: [facility], fax [phone]    Surgeon:  Dr Cristo Gaston    Assessment/Plan:     Chirag was seen today for pre-op exam.    Preoperative examination  -     Hemoglobin  -     Electrocardiogram Perform and Read    Right hydrocele    Surgical Procedure Risk: Low (reported cardiac risk generally < 1%)  Have you had prior anesthesia?: Yes  Have you or any family members had a previous anesthesia reaction:  No  Do you or any family members have a history of a clotting or bleeding disorder?: No  Cardiac Risk Assessment: no increased risk for major cardiac complications    Patient approved for surgery with general or local anesthesia.    Functional Status: Independent  Patient plans to recover at home with family.     Subjective:      Chirag Gomez is a 62 y.o. male who presents for a preoperative consultation.      He has a symptomatic right-sided hydrocele and need surgical repair.  He has had prior surgeries without any anesthesia complications.  No history of blood clots or bleeding disorders.  There is no family history of anesthesia reactions blood clots or bleeding disorders.  He denies any acute illnesses.  No chest pain shortness of breath.  No history of any vascular disease.  Has acid reflux under good control.  In the process of weaning off of medication therapy for his acid reflux.    All other systems reviewed and are negative, other than those listed in the HPI.    Pertinent History  Do you have difficulty breathing or chest pain after walking up a flight of stairs: No  History of obstructive sleep apnea: No  Steroid use in the last 6 months: No  Frequent Aspirin/NSAID use: No  Prior Blood Transfusion: No  Prior Blood Transfusion Reaction: No  If for some reason prior to, during or after the procedure, if it is medically indicated, would you be willing to have a blood transfusion?:  Okay with  "transfusion    Current Outpatient Prescriptions   Medication Sig Dispense Refill     omeprazole (PRILOSEC) 20 MG capsule Take 20 mg by mouth daily before breakfast.       No current facility-administered medications for this visit.       No Known Allergies    Patient Active Problem List   Diagnosis     Esophageal reflux     Acute Bronchitis     Lumbar Radiculopathy     Cholecystitis     Elevated BP     Calculus of bile duct with acute cholecystitis without obstruction     Encounter for screening for malignant neoplasm of colon     Reflux esophagitis     Rectal polyp     Polyp of colon     Diaphragmatic hernia     Gastroesophageal reflux disease     Esophagitis     Stricture and stenosis of esophagus     Special screening for malignant neoplasms, colon     Diverticular disease of large intestine     Diverticulosis of large intestine without perforation or abscess without bleeding     No past medical history on file.    Past Surgical History:   Procedure Laterality Date     LAPAROSCOPIC CHOLECYSTECTOMY N/A 12/6/2016    Procedure: CHOLECYSTECTOMY LAPAROSCOPIC;  Surgeon: Layton Dill MD;  Location: [facility] OR;  Service:      Social History     Social History     Marital status:      Spouse name: N/A     Number of children: N/A     Years of education: N/A     Occupational History     Not on file.     Social History Main Topics     Smoking status: Never Smoker     Smokeless tobacco: Never Used     Alcohol use 7.2 oz/week     12 Cans of beer per week     Drug use: No     Sexual activity: Not on file     Other Topics Concern     Not on file     Social History Narrative     Patient Care Team:  George Oliver MD as PCP - General    Objective:     Vitals:    03/05/18 1452   BP: 126/76   Pulse: 72   Resp: 16   SpO2: 96%   Weight: 198 lb (89.8 kg)   Height: 5' 11\" (1.803 m)     Physical Exam:    General Appearance:    Alert, cooperative, no distress   Eyes:    No conjunctival irritation, no scleral icterus  "      Ears:    Normal TM's and external ear canals, both ears   Throat:   Lips, mucosa, and tongue normal; teeth and gums normal   Neck:   Supple, symmetrical, trachea midline, no adenopathy;        thyroid:  No enlargement/tenderness/nodules   Lungs:     Clear to auscultation bilaterally, respirations unlabored   Heart:    Regular rate and rhythm, S1 and S2 normal, no murmur, rub   or gallop   Abdomen:     Soft, non-tender, bowel sounds active,     no masses, no organomegaly   Extremities:   Extremities normal, atraumatic, no cyanosis or edema   Skin:   Skin color, texture, turgor normal, no rashes or lesions   Neurologic:   Normal strength and sensation        Recent Results (from the past 240 hour(s))   Electrocardiogram Perform and Read   Result Value Ref Range    SYSTOLIC BLOOD PRESSURE  mmHg    DIASTOLIC BLOOD PRESSURE  mmHg    VENTRICULAR RATE 63 BPM    ATRIAL RATE 63 BPM    P-R INTERVAL 130 ms    QRS DURATION 96 ms    Q-T INTERVAL 380 ms    QTC CALCULATION (BEZET) 388 ms    P Axis 60 degrees    R AXIS 29 degrees    T AXIS 21 degrees    MUSE DIAGNOSIS       Normal sinus rhythm  Normal ECG  When compared with ECG of 05-DEC-2016 22:40,  No significant change was found     Hemoglobin   Result Value Ref Range    Hemoglobin 15.3 14.0 - 18.0 g/dL       Immunization History   Administered Date(s) Administered     DT (pediatric) 01/18/2006     Influenza, inj, historic,unspecified 11/07/2007, 10/01/2016, 10/01/2017     Influenza, seasonal,quad inj 36+ mos 10/09/2015     Influenza, seasonal,quad inj 6-35 mos 11/29/2012     Influenza,seasonal, Inj IIV3 11/07/2007, 11/29/2012     Td, adult adsorbed, PF 01/18/2006     Td,adult,historic,unspecified 01/18/2006     Tdap 03/09/2012       Electronically signed by George Oliver MD 03/05/18 2:55 PM

## 2021-06-16 NOTE — ANESTHESIA PREPROCEDURE EVALUATION
Anesthesia Evaluation      Patient summary reviewed   No history of anesthetic complications     Airway   Mallampati: I  Neck ROM: full   Pulmonary - negative ROS and normal exam    breath sounds clear to auscultation  COPD: hx bronchitis.                         Cardiovascular - negative ROS and normal exam  Exercise tolerance: > or = 4 METS  Rhythm: regular  Rate: normal,         Neuro/Psych - negative ROS     Endo/Other - negative ROS      GI/Hepatic/Renal    (+) GERD well controlled,             Dental - normal exam                        Anesthesia Plan  Planned anesthetic: general LMA    ASA 2   Induction: intravenous   Anesthetic plan and risks discussed with: patient    Post-op plan: routine recovery

## 2021-06-16 NOTE — ANESTHESIA CARE TRANSFER NOTE
Last vitals:   Vitals:    03/12/18 1037   BP: 141/84   Pulse: 84   Resp: 16   Temp: 36  C (96.8  F)   SpO2: 100%     Patient's level of consciousness is drowsy  Spontaneous respirations: yes  Maintains airway independently: yes  Dentition unchanged: yes  Oropharynx: oropharynx clear of all foreign objects    QCDR Measures:  ASA# 20 - Surgical Safety Checklist: WHO surgical safety checklist completed prior to induction  PQRS# 430 - Adult PONV Prevention: 4558F - Pt received => 2 anti-emetic agents (different classes) preop & intraop  ASA# 8 - Peds PONV Prevention: 4558F - Pt received => 2 anti-emetic agents (different classes) preop & intraop  PQRS# 424 - Angi-op Temp Management: 4559F - At least one body temp DOCUMENTED => 35.5C or 95.9F within required timeframe  PQRS# 426 - PACU Transfer Protocol: - Transfer of care checklist used  ASA# 14 - Acute Post-op Pain: ASA14B - Patient did NOT experience pain >= 7 out of 10

## 2021-06-18 NOTE — PATIENT INSTRUCTIONS - HE
Patient Instructions by George Oliver MD at 4/19/2021  7:40 AM     Author: George Oliver MD Service: -- Author Type: Physician    Filed: 4/19/2021  8:29 AM Encounter Date: 4/19/2021 Status: Signed    : George Oliver MD (Physician)         Patient Education   Signs of Hearing Loss  Hearing loss is a problem shared by many people. In fact, it is one of the most common health conditions, particularly as people age. Most people over age 65 have some hearing loss, and by age 80, almost everyone does. Because hearing loss usually occurs slowly over the years, you may not realize your hearing ability has gotten worse.       Have your hearing checked  Contact your [facility] care provider if you:    Have to strain to hear normal conversation.    Have to watch other peoples faces very carefully to follow what theyre saying.    Need to ask people to repeat what theyve said.    Often misunderstand what people are saying.    Turn the volume of the television or radio up so high that others complain.    Feel that people are mumbling when theyre talking to you.    Find that the effort to hear leaves you feeling tired and irritated.    Notice, when using the phone, that you hear better with 1 ear than the other.    7970-2922 [employer]. [address] [place] [address]. All rights reserved. This information is not intended as a substitute for professional medical care. Always follow your healthcare professional's instructions.         Patient Education   Preventing Falls in the Home  As you get older, falls are more likely. Thats because your reaction time slows. Your muscles and joints may also get stiffer, making them less flexible. Illness, medications, and vision changes can also affect your balance. A fall could leave you unable to live on your own. To make your home safer, follow these tips:    Floors    Put nonskid pads under area rugs.    Remove throw rugs.    Replace worn floor  coverings.    Tack carpets firmly to each step on carpeted stairs. Put nonskid strips on the edges of uncarpeted stairs.    Keep floors and stairs free of clutter and cords.    Arrange furniture so there are clear pathways.    Clean up any spills right away.    Bathrooms    Install grab bars in the tub or shower.    Apply nonskid strips or put a nonskid rubber mat in the tub or shower.    Sit on a bath chair to bathe.    Use bathmats with nonskid backing.    Lighting    Keep a flashlight in each room.    Put a nightlight along the pathway between the bedroom and the bathroom.    6187-5508 [employer]. [address] [place] [address], [place] [address]. All rights reserved. This information is not intended as a substitute for professional medical care. Always follow your healthcare professional's instructions.           Advance Directive  Patients advance directive was discussed and I am comfortable with the patients wishes.  Patient Education   Personalized Prevention Plan  You are due for the preventive services outlined below.  Your care team is available to assist you in scheduling these services.  If you have already completed any of these items, please share that information with your care team to update in your medical record.  Health Maintenance Due   Topic Date Due   ? HEPATITIS C SCREENING  Never done   ? Pneumococcal Vaccine: 65+ Years (2 of 2 - PPSV23) 02/11/2021   ? TD 18+ HE  03/09/2022

## 2021-06-18 NOTE — PATIENT INSTRUCTIONS - HE
Patient Instructions by George Oliver MD at 2/11/2020  8:20 AM     Author: George Oliver MD Service: -- Author Type: Physician    Filed: 2/11/2020  9:19 AM Encounter Date: 2/11/2020 Status: Signed    : George Oliver MD (Physician)         Patient Education   Signs of Hearing Loss  Hearing loss is a problem shared by many people. In fact, it is one of the most common health conditions, particularly as people age. Most people over age 65 have some hearing loss, and by age 80, almost everyone does. Because hearing loss usually occurs slowly over the years, you may not realize your hearing ability has gotten worse.       Have your hearing checked  Contact your [facility] care provider if you:    Have to strain to hear normal conversation.    Have to watch other peoples faces very carefully to follow what theyre saying.    Need to ask people to repeat what theyve said.    Often misunderstand what people are saying.    Turn the volume of the television or radio up so high that others complain.    Feel that people are mumbling when theyre talking to you.    Find that the effort to hear leaves you feeling tired and irritated.    Notice, when using the phone, that you hear better with 1 ear than the other.    8615-0674 [employer]. [address] [place] [address], [place] [address]. All rights reserved. This information is not intended as a substitute for professional medical care. Always follow your healthcare professional's instructions.           Advance Directive  Patients advance directive was discussed and I am comfortable with the patients wishes.  Patient Education   Personalized Prevention Plan  You are due for the preventive services outlined below.  Your care team is available to assist you in scheduling these services.  If you have already completed any of these items, please share that information with your care team to update in your medical record.  Health Maintenance   Topic Date Due   ?  HEPATITIS C SCREENING  1955   ? HIV SCREENING  11/21/1969   ? ADVANCE CARE PLANNING  11/21/1972   ? MEDICARE ANNUAL WELLNESS VISIT  11/21/2019   ? FALL RISK ASSESSMENT  11/21/2019   ? PNEUMOCOCCAL IMMUNIZATION 65+ LOW/MEDIUM RISK (1 of 2 - PCV13) 11/21/2019   ? TD 18+ HE  03/09/2022   ? LIPID  06/18/2023   ? COLONOSCOPY  03/29/2027   ? INFLUENZA VACCINE RULE BASED  Completed   ? TDAP ADULT ONE TIME DOSE  Completed   ? ZOSTER VACCINES  Addressed

## 2021-06-18 NOTE — PROGRESS NOTES
" Patient ID: Chirag Gomez is a 62 y.o. male.  /74  Pulse 66  Ht 5' 11\" (1.803 m)  Wt 197 lb (89.4 kg)  SpO2 98%  BMI 27.48 kg/m2    Assessment/Plan:                  Diagnoses and all orders for this visit:    Routine general medical examination at a health care facility  -     PSA, Annual Screen (Prostatic-Specific Antigen)  -     Comprehensive Metabolic Panel  -     Lipid Cascade    Hyperglycemia  -     Glycosylated Hemoglobin A1c      DISCUSSION  Obtain labs as above.  Subjective:     HPI    Chirag Gomez is here today for a physical.  Generally healthy.  Currently takes omeprazole for management of acid reflux.  Has tried to taper off of medication but has had recurrence of symptoms.  Discussed the benefits of remaining on therapy at this point in time.  Has had mildly elevated A1c test done last year at 6.0.  Blood pressure and cholesterol numbers have generally been ideal.  Reviewed routine health preventive information as noted below.  Also discussed shingles vaccine.  Patient does feel that he has more fatigue.  Does not snore.  Generally gets good sleep.  Does admittedly have more stress in his life at this point in time.  Discussed ruling out other more organic causes for fatigue.  Reviewed the importance of good sleep, healthy balanced diet and regular exercise.      Review of Systems  Complete review of systems is obtained.  Other than the specific considerations noted above complete review of systems is negative.    Objective:   Medications:  Current Outpatient Prescriptions   Medication Sig     omeprazole (PRILOSEC) 20 MG capsule Take 20 mg by mouth daily before breakfast.       Allergies:  No Known Allergies    Tobacco:   reports that he has never smoked. He has never used smokeless tobacco.    HEALTH PREVENTION    General  Dental care: Discussed the importance of regular dental care.  Eye care: Discussed importance of routine eye exams for glaucoma screening  Exercise: " "Discussed his exercise regiment and reinforced the importance of continuing to exercise on a regular basis.  Diet: Discussed his diet noting that he has a good healthy balance.  Strive for calorie reduction overall.    Wt Readings from Last 3 Encounters:   06/18/18 197 lb (89.4 kg)   03/08/18 189 lb (85.7 kg)   03/05/18 198 lb (89.8 kg)     Body mass index is 27.48 kg/(m^2).    The following high BMI interventions were performed this visit: encouragement to exercise    Cancer screening  Testicular cancer:is discussed and exam performed today  Skin cancer: Discussed sun burn prevention and self monitoring.  Colon cancer: Colon cancer screening is discussed.  Reviewed most recent colonoscopy report from March 2017 noting he is due again in 10 years.  Prostate cancer: Discussed we will continue to obtain PSA test for screening.    Cholesterol:   LDL Calculated (mg/dL)   Date Value   07/17/2017 122   09/06/2016 72   03/09/2012 124      Blood Pressure:   BP Readings from Last 3 Encounters:   06/18/18 122/74   03/12/18 150/80   03/05/18 126/76       Immunization History   Administered Date(s) Administered     DT (pediatric) 01/18/2006     Influenza, inj, historic,unspecified 11/07/2007, 10/01/2016, 10/01/2017     Influenza, seasonal,quad inj 36+ mos 10/09/2015     Influenza, seasonal,quad inj 6-35 mos 11/29/2012     Influenza,seasonal, Inj IIV3 11/07/2007, 11/29/2012     Td, adult adsorbed, PF 01/18/2006     Td,adult,historic,unspecified 01/18/2006     Tdap 03/09/2012     There are no preventive care reminders to display for this patient.     Physical Exam        /74  Pulse 66  Ht 5' 11\" (1.803 m)  Wt 197 lb (89.4 kg)  SpO2 98%  BMI 27.48 kg/m2      General Appearance:    Alert, cooperative, no distress, appears stated age   Eyes:   No scleral icterus or conjunctival irritation       Ears:    Normal TM's and external ear canals, both ears   Throat:   Lips, mucosa, and tongue normal; teeth and gums normal "   Neck:   Supple, symmetrical, trachea midline, no adenopathy;        thyroid:  No enlargement/tenderness/nodules   Lungs:     Clear to auscultation bilaterally, respirations unlabored, no wheezes or crackles   Heart:    Regular rate and rhythm,  no murmur, rub   or gallop   Abdomen:     Soft, non-tender, bowel sounds active,     no masses, no organomegaly     Extremities:   Extremities normal, atraumatic, no cyanosis or edema   Skin:   Skin color, texture, turgor normal, no rashes or lesions   Neurologic:   Normal strength and sensation        throughout on gross examination.

## 2021-06-20 NOTE — PROGRESS NOTES
Patient ID: Chirag Gomez is a 62 y.o. male.  /90  Pulse 70  Temp 98.3  F (36.8  C)  Wt 199 lb (90.3 kg)  SpO2 99%  BMI 27.75 kg/m2    Assessment/Plan:                   Diagnoses and all orders for this visit:    Abdominal pain, epigastric  -     Comprehensive Metabolic Panel  -     Lipase  -     HM2(CBC w/o Differential)  -     US Abdomen Complete; Future; Expected date: 10/9/18          DISCUSSION  Symptoms unclear, possible common duct stone as a cause of symptoms.  Other etiologies would be possible gastritis, kidney stone or other.  Discussed obtaining laboratory tests and imaging study using ultrasound.  No concern for cardiac etiology based on the clinical history.  Subjective:     HPI    Chirag Gomez is a 62 y.o. male who is here today to discuss abdominal pain.  Patient reports he had a bout of abdominal pain this past week and while [place] his cabin.  Patient states that he became extremely distressed by the epigastric pain.  He reports it is being somewhat similar to when he like his gallbladder taken out in 2016.  Patient reports the pain remained intense.  He called the nurse line who suggested he call an ambulance which he did.  The ambulance came and evaluated him.  He was not brought in for any further testing after they did some on the spot cardiac evaluation.  His pain then spontaneously completely resolved.  He has not had any recurrence of his pain since this happened 3 days prior to his clinic visit.  He does not report any other associated symptoms no vomiting no diarrhea no change in the color bowel movements no other changes in consistency no fevers chills or weight loss.  Weight is noted to have increased.    Review of Systems  Complete review of systems is obtained.  Other than the specific considerations noted above complete review of systems is negative.          Objective:   Medications:  Current Outpatient Prescriptions   Medication Sig     omeprazole (PRILOSEC) 20  MG capsule Take 20 mg by mouth daily before breakfast.     ranitidine (ZANTAC) 150 MG tablet Take 150 mg by mouth 2 (two) times a day.       Allergies:  No Known Allergies    Tobacco:   reports that he has never smoked. He has never used smokeless tobacco.     Physical Exam          /90  Pulse 70  Temp 98.3  F (36.8  C)  Wt 199 lb (90.3 kg)  SpO2 99%  BMI 27.75 kg/m2      General Appearance:    Alert, cooperative, no distress, appears stated age   Eyes:   No scleral icterus or conjunctival irritation       Throat:   Lips, mucosa, and tongue normal; teeth and gums normal   Neck:   Supple, symmetrical, trachea midline, no adenopathy;        thyroid:  No enlargement/tenderness/nodules   Lungs:     Clear to auscultation bilaterally, respirations unlabored, no wheezes or crackles   Heart:    Regular rate and rhythm,  no murmur, rub   or gallop   Abdomen:     Soft, non-tender, bowel sounds active,     no masses, no organomegaly     Extremities:   Extremities normal, atraumatic, no cyanosis or edema   Skin:   Skin color, texture, turgor normal, no rashes or lesions   Neurologic:   Normal strength and sensation        throughout on gross examination.

## 2021-06-27 ENCOUNTER — HEALTH MAINTENANCE LETTER (OUTPATIENT)
Age: 66
End: 2021-06-27

## 2021-10-12 ENCOUNTER — IMMUNIZATION (OUTPATIENT)
Dept: FAMILY MEDICINE | Facility: CLINIC | Age: 66
End: 2021-10-12
Payer: COMMERCIAL

## 2021-10-12 PROCEDURE — G0008 ADMIN INFLUENZA VIRUS VAC: HCPCS

## 2021-10-12 PROCEDURE — 90662 IIV NO PRSV INCREASED AG IM: CPT

## 2022-04-26 ASSESSMENT — ACTIVITIES OF DAILY LIVING (ADL): CURRENT_FUNCTION: NO ASSISTANCE NEEDED

## 2022-05-03 ENCOUNTER — OFFICE VISIT (OUTPATIENT)
Dept: FAMILY MEDICINE | Facility: CLINIC | Age: 67
End: 2022-05-03
Payer: COMMERCIAL

## 2022-05-03 VITALS
WEIGHT: 191 LBS | BODY MASS INDEX: 26.74 KG/M2 | HEIGHT: 71 IN | RESPIRATION RATE: 18 BRPM | OXYGEN SATURATION: 98 % | SYSTOLIC BLOOD PRESSURE: 126 MMHG | HEART RATE: 70 BPM | DIASTOLIC BLOOD PRESSURE: 76 MMHG

## 2022-05-03 DIAGNOSIS — R10.12 LUQ ABDOMINAL PAIN: ICD-10-CM

## 2022-05-03 DIAGNOSIS — R73.03 PREDIABETES: ICD-10-CM

## 2022-05-03 DIAGNOSIS — R53.83 FATIGUE, UNSPECIFIED TYPE: ICD-10-CM

## 2022-05-03 DIAGNOSIS — E55.9 VITAMIN D DEFICIENCY: ICD-10-CM

## 2022-05-03 DIAGNOSIS — E78.5 DYSLIPIDEMIA: ICD-10-CM

## 2022-05-03 DIAGNOSIS — Z00.00 ROUTINE GENERAL MEDICAL EXAMINATION AT A HEALTH CARE FACILITY: Primary | ICD-10-CM

## 2022-05-03 DIAGNOSIS — Z12.5 SCREENING FOR PROSTATE CANCER: ICD-10-CM

## 2022-05-03 DIAGNOSIS — Z23 HIGH PRIORITY FOR 2019-NCOV VACCINE: ICD-10-CM

## 2022-05-03 LAB
ALBUMIN SERPL-MCNC: 4.2 G/DL (ref 3.5–5)
ALP SERPL-CCNC: 61 U/L (ref 45–120)
ALT SERPL W P-5'-P-CCNC: 16 U/L (ref 0–45)
ANION GAP SERPL CALCULATED.3IONS-SCNC: 12 MMOL/L (ref 5–18)
AST SERPL W P-5'-P-CCNC: 15 U/L (ref 0–40)
BASOPHILS # BLD AUTO: 0 10E3/UL (ref 0–0.2)
BASOPHILS NFR BLD AUTO: 1 %
BILIRUB SERPL-MCNC: 0.6 MG/DL (ref 0–1)
BUN SERPL-MCNC: 11 MG/DL (ref 8–22)
CALCIUM SERPL-MCNC: 9.5 MG/DL (ref 8.5–10.5)
CHLORIDE BLD-SCNC: 107 MMOL/L (ref 98–107)
CHOLEST SERPL-MCNC: 184 MG/DL
CO2 SERPL-SCNC: 26 MMOL/L (ref 22–31)
CREAT SERPL-MCNC: 0.9 MG/DL (ref 0.7–1.3)
DEPRECATED CALCIDIOL+CALCIFEROL SERPL-MC: 29 UG/L (ref 20–75)
EOSINOPHIL # BLD AUTO: 0.8 10E3/UL (ref 0–0.7)
EOSINOPHIL NFR BLD AUTO: 13 %
ERYTHROCYTE [DISTWIDTH] IN BLOOD BY AUTOMATED COUNT: 12.5 % (ref 10–15)
FASTING STATUS PATIENT QL REPORTED: YES
GFR SERPL CREATININE-BSD FRML MDRD: >90 ML/MIN/1.73M2
GLUCOSE BLD-MCNC: 97 MG/DL (ref 70–125)
HBA1C MFR BLD: 5.6 % (ref 0–5.6)
HCT VFR BLD AUTO: 45.9 % (ref 40–53)
HDLC SERPL-MCNC: 50 MG/DL
HGB BLD-MCNC: 15.6 G/DL (ref 13.3–17.7)
IMM GRANULOCYTES # BLD: 0 10E3/UL
IMM GRANULOCYTES NFR BLD: 0 %
LDLC SERPL CALC-MCNC: 112 MG/DL
LIPASE SERPL-CCNC: <9 U/L (ref 0–52)
LYMPHOCYTES # BLD AUTO: 0.8 10E3/UL (ref 0.8–5.3)
LYMPHOCYTES NFR BLD AUTO: 12 %
MCH RBC QN AUTO: 31.5 PG (ref 26.5–33)
MCHC RBC AUTO-ENTMCNC: 34 G/DL (ref 31.5–36.5)
MCV RBC AUTO: 93 FL (ref 78–100)
MONOCYTES # BLD AUTO: 0.4 10E3/UL (ref 0–1.3)
MONOCYTES NFR BLD AUTO: 6 %
NEUTROPHILS # BLD AUTO: 4.4 10E3/UL (ref 1.6–8.3)
NEUTROPHILS NFR BLD AUTO: 68 %
PLATELET # BLD AUTO: 269 10E3/UL (ref 150–450)
POTASSIUM BLD-SCNC: 4.4 MMOL/L (ref 3.5–5)
PROT SERPL-MCNC: 6.8 G/DL (ref 6–8)
PSA SERPL-MCNC: 0.55 UG/L (ref 0–4.5)
RBC # BLD AUTO: 4.96 10E6/UL (ref 4.4–5.9)
SODIUM SERPL-SCNC: 145 MMOL/L (ref 136–145)
TRIGL SERPL-MCNC: 111 MG/DL
TSH SERPL DL<=0.005 MIU/L-ACNC: 1.56 UIU/ML (ref 0.3–5)
VIT B12 SERPL-MCNC: 343 PG/ML (ref 213–816)
WBC # BLD AUTO: 6.4 10E3/UL (ref 4–11)

## 2022-05-03 PROCEDURE — 80053 COMPREHEN METABOLIC PANEL: CPT | Performed by: FAMILY MEDICINE

## 2022-05-03 PROCEDURE — 90715 TDAP VACCINE 7 YRS/> IM: CPT | Performed by: FAMILY MEDICINE

## 2022-05-03 PROCEDURE — 91306 COVID-19,PF,MODERNA (18+ YRS BOOSTER .25ML): CPT | Performed by: FAMILY MEDICINE

## 2022-05-03 PROCEDURE — 0064A COVID-19,PF,MODERNA (18+ YRS BOOSTER .25ML): CPT | Performed by: FAMILY MEDICINE

## 2022-05-03 PROCEDURE — 90471 IMMUNIZATION ADMIN: CPT | Performed by: FAMILY MEDICINE

## 2022-05-03 PROCEDURE — G0103 PSA SCREENING: HCPCS | Performed by: FAMILY MEDICINE

## 2022-05-03 PROCEDURE — 80061 LIPID PANEL: CPT | Performed by: FAMILY MEDICINE

## 2022-05-03 PROCEDURE — 82306 VITAMIN D 25 HYDROXY: CPT | Performed by: FAMILY MEDICINE

## 2022-05-03 PROCEDURE — 99397 PER PM REEVAL EST PAT 65+ YR: CPT | Mod: 25 | Performed by: FAMILY MEDICINE

## 2022-05-03 PROCEDURE — 36415 COLL VENOUS BLD VENIPUNCTURE: CPT | Performed by: FAMILY MEDICINE

## 2022-05-03 PROCEDURE — 85025 COMPLETE CBC W/AUTO DIFF WBC: CPT | Performed by: FAMILY MEDICINE

## 2022-05-03 PROCEDURE — 83036 HEMOGLOBIN GLYCOSYLATED A1C: CPT | Performed by: FAMILY MEDICINE

## 2022-05-03 PROCEDURE — 82607 VITAMIN B-12: CPT | Performed by: FAMILY MEDICINE

## 2022-05-03 PROCEDURE — 99214 OFFICE O/P EST MOD 30 MIN: CPT | Mod: 25 | Performed by: FAMILY MEDICINE

## 2022-05-03 PROCEDURE — 84443 ASSAY THYROID STIM HORMONE: CPT | Performed by: FAMILY MEDICINE

## 2022-05-03 PROCEDURE — 83690 ASSAY OF LIPASE: CPT | Performed by: FAMILY MEDICINE

## 2022-05-03 ASSESSMENT — ACTIVITIES OF DAILY LIVING (ADL)
DIFFICULTY_COMMUNICATING: NO
CONCENTRATING,_REMEMBERING_OR_MAKING_DECISIONS_DIFFICULTY: NO
DOING_ERRANDS_INDEPENDENTLY_DIFFICULTY: NO
HEARING_DIFFICULTY_OR_DEAF: NO
DRESSING/BATHING_DIFFICULTY: NO
WALKING_OR_CLIMBING_STAIRS_DIFFICULTY: NO
DIFFICULTY_EATING/SWALLOWING: NO
WEAR_GLASSES_OR_BLIND: YES
FALL_HISTORY_WITHIN_LAST_SIX_MONTHS: NO
CHANGE_IN_FUNCTIONAL_STATUS_SINCE_ONSET_OF_CURRENT_ILLNESS/INJURY: NO
TOILETING_ISSUES: NO

## 2022-05-03 ASSESSMENT — PAIN SCALES - GENERAL: PAINLEVEL: MILD PAIN (2)

## 2022-05-03 NOTE — PROGRESS NOTES
SUBJECTIVE:   Chirag Gomez is a 66 year old male who presents for Preventive Visit.    His medical history includes shoulder injury requiring surgical intervention about 2 years ago.  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  He has undergone PSA testing for prostate cancer screening.  Colonoscopy performed in 2017 with 10-year follow-up recommended.  He has prediabetes with A1c of 5.8.  No symptoms of hyperglycemia.  Weight is close to ideal.  Blood pressure is ideal.  Cholesterol higher than ideal but does not require medication treatment.  No history or symptoms of vascular disease.  He does have a history of cholecystectomy.  There have been concerns of potential common bile duct stones following his cholecystectomy but no concerns exist currently.    Reports over the past month has noticed more significant fatigue impacting his life.  Reports he might fall asleep easier later in the day but has difficulty maintaining energy level to accomplish tasks throughout the day.  Does note he also around this time developed a left upper quadrant abdominal discomfort.  He reports at worst it is mild pain.  It is more constant tends to wax and wane slightly.  No obvious inciting or alleviating factors are identified.  No change with bowel habits or appetite.  No change with urinary system.    Patient has been advised of split billing requirements and indicates understanding: Yes  Are you in the first 12 months of your Medicare coverage?  No      Do you feel safe in your environment? Yes    Have you ever done Advance Care Planning? (For example, a Health Directive, POLST, or a discussion with a medical provider or your loved ones about your wishes): Information provided      Cognitive Screening   1) Repeat 3 items (Leader, Season, Table)    2) Clock draw: NORMAL  3) 3 item recall: {Recalls 3 objects  Results: 3 items recalled: COGNITIVE IMPAIRMENT LESS LIKELY    Mini-CogTM  "Copyright [employer]. Licensed by the author for use in [facility]; reprinted with permission (grace@[facility]). All rights reserved.      Do you have sleep apnea, excessive snoring or daytime drowsiness?: no    Reviewed and updated as needed this visit by clinical staff    Reviewed and updated as needed this visit by Provider     Social History     Tobacco Use     Smoking status: Never Smoker     Smokeless tobacco: Never Used   Substance Use Topics     Alcohol use: Yes     Alcohol/week: 12.0 standard drinks     If you drink alcohol do you typically have >3 drinks per day or >7 drinks per week? No    Current providers sharing in care for this patient include:   Patient Care Team:  George Oliver MD as PCP - General (Family Practice)  George Oliver MD as Assigned PCP    The following health maintenance items are reviewed in Epic and correct as of today:  Health Maintenance Due   Topic Date Due     DTAP/TDAP/TD IMMUNIZATION (1 - Tdap) 03/09/2012     COVID-19 Vaccine (4 - Booster for Moderna series) 03/02/2022     FALL RISK ASSESSMENT  04/19/2022     BP Readings from Last 3 Encounters:   05/03/22 126/76   04/19/21 124/74   01/08/21 130/82    Wt Readings from Last 3 Encounters:   05/03/22 86.6 kg (191 lb)   04/19/21 86.4 kg (190 lb 6.4 oz)   01/08/21 89 kg (196 lb 1.6 oz)                  Current Outpatient Medications   Medication Sig Dispense Refill     omeprazole (PRILOSEC) 20 MG capsule [OMEPRAZOLE (PRILOSEC) 20 MG CAPSULE] Take 20 mg by mouth daily before breakfast.         Review of Systems  Complete review of systems is obtained.  Other than the specific considerations noted above complete review of systems is negative.      OBJECTIVE:   /76   Pulse 70   Resp 18   Ht 1.803 m (5' 11\")   Wt 86.6 kg (191 lb)   SpO2 98%   BMI 26.64 kg/m   Estimated body mass index is 26.64 kg/m  as calculated from the following:    Height as of this encounter: 1.803 m (5' 11\").    Weight as of this " encounter: 86.6 kg (191 lb).  Physical Exam          General Appearance:    Alert, cooperative, no distress   Eyes:   No scleral icterus or conjunctival irritation       Ears:    Normal TM's and external ear canals, both ears   Throat:   Lips, mucosa, and tongue normal; teeth and gums normal   Neck:   Supple, symmetrical, trachea midline, no adenopathy;        thyroid:  No enlargement/tenderness/nodules   Lungs:     Clear to auscultation bilaterally, respirations unlabored, no wheezes or crackles   Heart:    Regular rate and rhythm,  No murmur   Abdomen:    Soft, no distention, no tenderness on palpation, no masses, no organomegaly     Extremities:  No edema, no joint swelling or redness, no evidence of any injuries   Skin:  No concerning skin findings, no suspicious moles, no rashes   Neurologic:  On gross examination there is no motor or sensory deficit.  Patient walks with a normal gait       ASSESSMENT / PLAN:     Chirag was seen today for medicare visit, wellness visit, fatigue, flank pain and imm/inj.    Diagnoses and all orders for this visit:    Routine general medical examination at a health care facility  -     CBC with Platelets & Differential    Screening for prostate cancer  -     PSA, screen    Dyslipidemia  -     Lipid panel reflex to direct LDL Fasting  -     Comprehensive metabolic panel (BMP + Alb, Alk Phos, ALT, AST, Total. Bili, TP)    Fatigue, unspecified type  -     TSH with free T4 reflex  -     Vitamin B12  -     CBC with Platelets & Differential    Prediabetes  -     Hemoglobin A1c    LUQ abdominal pain  -     Comprehensive metabolic panel (BMP + Alb, Alk Phos, ALT, AST, Total. Bili, TP)  -     Lipase  -     XR Chest 2 Views; Future    Vitamin D deficiency  -     Vitamin D Deficiency    High priority for 2019-nCoV vaccine  -     COVID-19,PF,MODERNA (18+ Yrs BOOSTER .25mL)    Other orders  -     REVIEW OF HEALTH MAINTENANCE PROTOCOL ORDERS  -     TDAP VACCINE (Adacel, Boostrix)      "          COUNSELING:  Reviewed preventive health counseling, as reflected in patient instructions       Regular exercise       Healthy diet/nutrition       Vision screening       Dental care       Colon cancer screening       Prostate cancer screening    Estimated body mass index is 26.64 kg/m  as calculated from the following:    Height as of this encounter: 1.803 m (5' 11\").    Weight as of this encounter: 86.6 kg (191 lb).    Weight management plan: Discussed healthy diet and exercise guidelines    He reports that he has never smoked. He has never used smokeless tobacco.      Appropriate preventive services were discussed with this patient, including applicable screening as appropriate for cardiovascular disease, diabetes, osteopenia/osteoporosis, and glaucoma.  As appropriate for age/gender, discussed screening for colorectal cancer, prostate cancer, breast cancer, and cervical cancer. Checklist reviewing preventive services available has been given to the patient.    Reviewed patients plan of care and provided an AVS. The Basic Care Plan (routine screening as documented in Health Maintenance) for Chirag meets the Care Plan requirement. This Care Plan has been established and reviewed with the Patient.    Counseling Resources:  ATP IV Guidelines  Pooled Cohorts Equation Calculator  Breast Cancer Risk Calculator  Breast Cancer: Medication to Reduce Risk  FRAX Risk Assessment  ICSI Preventive Guidelines  Dietary Guidelines for Americans, 2010  Canary's MyPlate  ASA Prophylaxis  Lung CA Screening    George Oliver MD, MD  [facility]    Identified Health Risks:  Answers for HPI/ROS submitted by the patient on 4/26/2022  In general, how would you rate your overall physical health?: good  Frequency of exercise:: 4-5 days/week  Do you usually eat at least 4 servings of fruit and vegetables a day, include whole grains & fiber, and avoid regularly eating high fat or \"junk\" foods? : Yes  Taking " medications regularly:: Yes  Medication side effects:: None  Activities of Daily Living: no assistance needed  Home safety: no safety concerns identified  Hearing Impairment:: no hearing concerns  In the past 6 months, have you been bothered by leaking of urine?: No  In general, how would you rate your overall mental or emotional health?: good  Additional concerns today:: Yes  Duration of exercise:: 30-45 minutes    Wt Readings from Last 3 Encounters:   05/03/22 86.6 kg (191 lb)   04/19/21 86.4 kg (190 lb 6.4 oz)   01/08/21 89 kg (196 lb 1.6 oz)        BP Readings from Last 6 Encounters:   05/03/22 126/76   04/19/21 124/74   01/08/21 130/82   09/21/20 138/84   02/11/20 (!) 140/84        Hemoglobin A1C   Date Value Ref Range Status   04/19/2021 5.8 (H) <=5.6 % Final   02/11/2020 5.8 3.5 - 6.0 % Final   06/18/2018 5.9 3.5 - 6.0 % Final

## 2022-09-26 ENCOUNTER — IMMUNIZATION (OUTPATIENT)
Dept: PEDIATRICS | Facility: CLINIC | Age: 67
End: 2022-09-26
Payer: COMMERCIAL

## 2022-09-26 DIAGNOSIS — Z23 NEED FOR PROPHYLACTIC VACCINATION AND INOCULATION AGAINST INFLUENZA: Primary | ICD-10-CM

## 2022-09-26 PROCEDURE — G0008 ADMIN INFLUENZA VIRUS VAC: HCPCS

## 2022-09-26 PROCEDURE — 90662 IIV NO PRSV INCREASED AG IM: CPT

## 2022-09-26 PROCEDURE — 99207 PR NO CHARGE NURSE ONLY: CPT

## 2022-10-31 ENCOUNTER — IMMUNIZATION (OUTPATIENT)
Dept: FAMILY MEDICINE | Facility: CLINIC | Age: 67
End: 2022-10-31
Payer: COMMERCIAL

## 2022-10-31 PROCEDURE — 91313 COVID-19,PF,MODERNA BIVALENT: CPT

## 2022-10-31 PROCEDURE — 0134A COVID-19,PF,MODERNA BIVALENT: CPT

## 2022-12-23 ENCOUNTER — ANCILLARY PROCEDURE (OUTPATIENT)
Dept: GENERAL RADIOLOGY | Facility: CLINIC | Age: 67
End: 2022-12-23
Attending: FAMILY MEDICINE
Payer: COMMERCIAL

## 2022-12-23 ENCOUNTER — OFFICE VISIT (OUTPATIENT)
Dept: FAMILY MEDICINE | Facility: CLINIC | Age: 67
End: 2022-12-23
Payer: COMMERCIAL

## 2022-12-23 VITALS
DIASTOLIC BLOOD PRESSURE: 76 MMHG | HEIGHT: 71 IN | OXYGEN SATURATION: 96 % | HEART RATE: 62 BPM | WEIGHT: 191.2 LBS | BODY MASS INDEX: 26.77 KG/M2 | RESPIRATION RATE: 18 BRPM | SYSTOLIC BLOOD PRESSURE: 128 MMHG

## 2022-12-23 DIAGNOSIS — K21.9 GASTROESOPHAGEAL REFLUX DISEASE, UNSPECIFIED WHETHER ESOPHAGITIS PRESENT: Primary | ICD-10-CM

## 2022-12-23 DIAGNOSIS — R07.9 CHEST PAIN, UNSPECIFIED TYPE: ICD-10-CM

## 2022-12-23 DIAGNOSIS — R53.83 FATIGUE, UNSPECIFIED TYPE: ICD-10-CM

## 2022-12-23 LAB
ALBUMIN SERPL BCG-MCNC: 4.4 G/DL (ref 3.5–5.2)
ALP SERPL-CCNC: 70 U/L (ref 40–129)
ALT SERPL W P-5'-P-CCNC: 19 U/L (ref 10–50)
ANION GAP SERPL CALCULATED.3IONS-SCNC: 11 MMOL/L (ref 7–15)
AST SERPL W P-5'-P-CCNC: 21 U/L (ref 10–50)
BASOPHILS # BLD AUTO: 0.1 10E3/UL (ref 0–0.2)
BASOPHILS NFR BLD AUTO: 1 %
BILIRUB SERPL-MCNC: 0.3 MG/DL
BUN SERPL-MCNC: 15.6 MG/DL (ref 8–23)
CALCIUM SERPL-MCNC: 9.2 MG/DL (ref 8.8–10.2)
CHLORIDE SERPL-SCNC: 107 MMOL/L (ref 98–107)
CREAT SERPL-MCNC: 0.96 MG/DL (ref 0.67–1.17)
DEPRECATED HCO3 PLAS-SCNC: 26 MMOL/L (ref 22–29)
EOSINOPHIL # BLD AUTO: 0.9 10E3/UL (ref 0–0.7)
EOSINOPHIL NFR BLD AUTO: 16 %
ERYTHROCYTE [DISTWIDTH] IN BLOOD BY AUTOMATED COUNT: 12.2 % (ref 10–15)
GFR SERPL CREATININE-BSD FRML MDRD: 86 ML/MIN/1.73M2
GLUCOSE SERPL-MCNC: 101 MG/DL (ref 70–99)
HCT VFR BLD AUTO: 46 % (ref 40–53)
HGB BLD-MCNC: 15.5 G/DL (ref 13.3–17.7)
IMM GRANULOCYTES # BLD: 0 10E3/UL
IMM GRANULOCYTES NFR BLD: 0 %
LYMPHOCYTES # BLD AUTO: 1 10E3/UL (ref 0.8–5.3)
LYMPHOCYTES NFR BLD AUTO: 18 %
MCH RBC QN AUTO: 31.6 PG (ref 26.5–33)
MCHC RBC AUTO-ENTMCNC: 33.7 G/DL (ref 31.5–36.5)
MCV RBC AUTO: 94 FL (ref 78–100)
MONOCYTES # BLD AUTO: 0.4 10E3/UL (ref 0–1.3)
MONOCYTES NFR BLD AUTO: 8 %
NEUTROPHILS # BLD AUTO: 3.3 10E3/UL (ref 1.6–8.3)
NEUTROPHILS NFR BLD AUTO: 58 %
PLATELET # BLD AUTO: 255 10E3/UL (ref 150–450)
POTASSIUM SERPL-SCNC: 4.5 MMOL/L (ref 3.4–5.3)
PROT SERPL-MCNC: 6.7 G/DL (ref 6.4–8.3)
RBC # BLD AUTO: 4.91 10E6/UL (ref 4.4–5.9)
SODIUM SERPL-SCNC: 144 MMOL/L (ref 136–145)
TSH SERPL DL<=0.005 MIU/L-ACNC: 1.74 UIU/ML (ref 0.3–4.2)
WBC # BLD AUTO: 5.6 10E3/UL (ref 4–11)

## 2022-12-23 PROCEDURE — 36415 COLL VENOUS BLD VENIPUNCTURE: CPT | Performed by: FAMILY MEDICINE

## 2022-12-23 PROCEDURE — 85025 COMPLETE CBC W/AUTO DIFF WBC: CPT | Performed by: FAMILY MEDICINE

## 2022-12-23 PROCEDURE — 93005 ELECTROCARDIOGRAM TRACING: CPT | Performed by: FAMILY MEDICINE

## 2022-12-23 PROCEDURE — 84443 ASSAY THYROID STIM HORMONE: CPT | Performed by: FAMILY MEDICINE

## 2022-12-23 PROCEDURE — 71046 X-RAY EXAM CHEST 2 VIEWS: CPT | Mod: TC | Performed by: RADIOLOGY

## 2022-12-23 PROCEDURE — 80053 COMPREHEN METABOLIC PANEL: CPT | Performed by: FAMILY MEDICINE

## 2022-12-23 PROCEDURE — 99214 OFFICE O/P EST MOD 30 MIN: CPT | Performed by: FAMILY MEDICINE

## 2022-12-23 PROCEDURE — 93010 ELECTROCARDIOGRAM REPORT: CPT | Performed by: INTERNAL MEDICINE

## 2022-12-23 ASSESSMENT — PAIN SCALES - GENERAL: PAINLEVEL: NO PAIN (0)

## 2022-12-23 NOTE — PROGRESS NOTES
"Chirag Gomez  /75   Pulse 62   Resp 18   Ht 1.803 m (5' 11\")   Wt 86.7 kg (191 lb 3.2 oz)   SpO2 96%   BMI 26.67 kg/m       Assessment/Plan:                Chirag was seen today for gastric problem and fatigue.    Diagnoses and all orders for this visit:    Gastroesophageal reflux disease, unspecified whether esophagitis present  -     Comprehensive metabolic panel  -     CBC with Platelets & Differential  -     Adult GI  Referral - Procedure Only; Future    Chest pain, unspecified type  -     Comprehensive metabolic panel  -     CBC with Platelets & Differential  -     EKG 12-lead, tracing only  -     XR Chest 2 Views; Future    Fatigue, unspecified type  -     CBC with Platelets & Differential  -     TSH with free T4 reflex         DISCUSSION  Discussed the importance of ruling out cardiac and pulmonary causes for his symptoms.  Based on his clinical description it sounds much less likely to be cardiac in origin or to be something significant involving his pulmonary system such as pulmonary embolus given the discrete attacks without associated significant dyspnea or any difficulties with being more active.  We elected to obtain EKG, chest x-ray and laboratory testing during his visit.    EKG shows normal sinus rhythm without any concerns for ischemia or other cardiac problems.  Chest x-ray is normal.  Hemogram does not show any anemia or elevation in white blood cell count.    My suspicion at this point is that this represents acid reflux.  I have considered the other concerns as listed above but do not think that he requires further evaluation in that regard at this time but he does agree should he have worsening persistent or distressing symptoms that he will seek emergent medical care.  We will make arrangements for him to undergo endoscopy.  He will increase his omeprazole to twice daily.  Subjective:     HPI:    Chirag Gomez is a 67 year old male with a history of acid " reflux who reports undergoing endoscopy a number of years ago is here today concerned regarding bouts of acid reflux.  Has medical history is otherwise relatively benign.  From 2016 through 2019 he struggled with some right upper quadrant abdominal pain following a cholecystectomy and saw gastroenterology but this has largely resolved.    Patient reports that just over a month ago he had what he describes as an attack of acid reflux.  He reports after drinking a couple of beers 1 evening he had abdominal discomfort in the epigastric region with discomfort in the lower substernal region and a sensation of acid coming up into the throat.  This persisted for about 4 hours and then subsided.  He reports having 2 subsequent attacks at least one of them was associated with going out to eat.  He does report in the past with acid reflux having triggers of alcohol.  He has not had significant issues with acid reflux for a number of years.  He does state that generally between the attacks he feels relatively normal but does state that he feels more of a lingering type sensation in his chest.  He denies any dysphagia or sensation of food getting stuck or painful swallowing.  He denies hematemesis hematochezia or melena.  Weight is stable he reports his appetite is good.  He denies any dyspnea with exertion.  In fact he reports that with activity he generally feels better overall.  Denies any other indications or instances of chest pain aside from these 3 incidences.  He is taking omeprazole 20 mg once daily every morning before breakfast.    ROS:  Complete review of systems is obtained.  Other than the specific considerations noted above complete review of systems is negative.      Objective:   Medications:  Current Outpatient Medications   Medication     omeprazole (PRILOSEC) 20 MG capsule     No current facility-administered medications for this visit.        Allergies:   No Known Allergies     Social History      Socioeconomic History     Marital status:      Spouse name: Not on file     Number of children: Not on file     Years of education: Not on file     Highest education level: Not on file   Occupational History     Not on file   Tobacco Use     Smoking status: Never     Smokeless tobacco: Never   Vaping Use     Vaping Use: Never used   Substance and Sexual Activity     Alcohol use: Yes     Alcohol/week: 12.0 standard drinks     Drug use: No     Sexual activity: Not on file   Other Topics Concern     Not on file   Social History Narrative     Not on file     Social Determinants of Health     Financial Resource Strain: Not on file   Food Insecurity: Not on file   Transportation Needs: Not on file   Physical Activity: Not on file   Stress: Not on file   Social Connections: Not on file   Intimate Partner Violence: Not on file   Housing Stability: Not on file       No family history on file.     Most Recent Immunizations   Administered Date(s) Administered     COVID-19 Vaccine 18+ (Moderna) 11/02/2021     COVID-19 Vaccine Bivalent Booster 18+ (Moderna) 10/31/2022     COVID-19,PF,Moderna Booster 05/03/2022     DT (PEDS <7y) 01/18/2006     Flu, Unspecified 10/01/2017     Influenza (High Dose) 3 valent vaccine 09/26/2022     Influenza (IIV3) PF 11/29/2012     Influenza Vaccine 50-64 or 18-64 w/egg allergy (Flublok) 10/08/2019     Influenza Vaccine 65+ (Fluzone HD) 09/26/2022     Influenza Vaccine, 6+MO IM (QUADRIVALENT W/PRESERVATIVES) 10/02/2018     Pneumo Conj 13-V (2010&after) 02/11/2020     Pneumococcal 23 valent 04/19/2021     TD (ADULT, 7+) 01/18/2006     Td,adult,historic,unspecified 01/18/2006     Tdap (Adacel,Boostrix) 05/03/2022     Zoster vaccine recombinant adjuvanted (SHINGRIX) 04/19/2021        Wt Readings from Last 3 Encounters:   12/23/22 86.7 kg (191 lb 3.2 oz)   05/03/22 86.6 kg (191 lb)   04/19/21 86.4 kg (190 lb 6.4 oz)        BP Readings from Last 6 Encounters:   12/23/22 128/76   05/03/22  "126/76   04/19/21 124/74   01/08/21 130/82   09/21/20 138/84   02/11/20 (!) 140/84        Hemoglobin A1C   Date Value Ref Range Status   05/03/2022 5.6 0.0 - 5.6 % Final     Comment:     Normal <5.7%   Prediabetes 5.7-6.4%    Diabetes 6.5% or higher     Note: Adopted from ADA consensus guidelines.   04/19/2021 5.8 (H) <=5.6 % Final   02/11/2020 5.8 3.5 - 6.0 % Final      PHYSICAL EXAM:    /76   Pulse 62   Resp 18   Ht 1.803 m (5' 11\")   Wt 86.7 kg (191 lb 3.2 oz)   SpO2 96%   BMI 26.67 kg/m           General Appearance:    Alert, cooperative, no distress   Eyes:   No scleral icterus or conjunctival irritation       Throat:   Lips, mucosa, and tongue normal; teeth and gums normal   Neck:   Supple, symmetrical, trachea midline, no adenopathy;        thyroid:  No enlargement/tenderness/nodules   Lungs:     Clear to auscultation bilaterally, respirations unlabored, no wheezes or crackles   Heart:    Regular rate and rhythm,  No murmur   Abdomen:    Soft, no distention, no tenderness on palpation, no masses, no organomegaly     Extremities:  No edema, no joint swelling or redness, no evidence of any injuries   Skin:  No concerning skin findings, no suspicious moles, no rashes   Neurologic:  On gross examination there is no motor or sensory deficit.  Patient walks with a normal gait                       Answers for HPI/ROS submitted by the patient on 12/19/2022  What is the reason for your visit today? : acid reflux issue  How many servings of fruits and vegetables do you eat daily?: 2-3  On average, how many sweetened beverages do you drink each day (Examples: soda, juice, sweet tea, etc.  Do NOT count diet or artificially sweetened beverages)?: 0  How many minutes a day do you exercise enough to make your heart beat faster?: 30 to 60  How many days a week do you exercise enough to make your heart beat faster?: 7  How many days per week do you miss taking your medication?: 0                    "

## 2022-12-24 LAB
ATRIAL RATE - MUSE: 56 BPM
DIASTOLIC BLOOD PRESSURE - MUSE: NORMAL MMHG
INTERPRETATION ECG - MUSE: NORMAL
P AXIS - MUSE: 71 DEGREES
PR INTERVAL - MUSE: 130 MS
QRS DURATION - MUSE: 86 MS
QT - MUSE: 414 MS
QTC - MUSE: 399 MS
R AXIS - MUSE: 40 DEGREES
SYSTOLIC BLOOD PRESSURE - MUSE: NORMAL MMHG
T AXIS - MUSE: 51 DEGREES
VENTRICULAR RATE- MUSE: 56 BPM

## 2023-01-06 ENCOUNTER — TRANSFERRED RECORDS (OUTPATIENT)
Dept: HEALTH INFORMATION MANAGEMENT | Facility: CLINIC | Age: 68
End: 2023-01-06
Payer: COMMERCIAL

## 2023-02-13 ENCOUNTER — VIRTUAL VISIT (OUTPATIENT)
Dept: FAMILY MEDICINE | Facility: CLINIC | Age: 68
End: 2023-02-13
Payer: COMMERCIAL

## 2023-02-13 ENCOUNTER — TELEPHONE (OUTPATIENT)
Dept: FAMILY MEDICINE | Facility: CLINIC | Age: 68
End: 2023-02-13

## 2023-02-13 ENCOUNTER — NURSE TRIAGE (OUTPATIENT)
Dept: FAMILY MEDICINE | Facility: CLINIC | Age: 68
End: 2023-02-13

## 2023-02-13 DIAGNOSIS — U07.1 INFECTION DUE TO 2019 NOVEL CORONAVIRUS: Primary | ICD-10-CM

## 2023-02-13 PROBLEM — D89.89 OTHER SPECIFIED DISORDERS INVOLVING THE IMMUNE MECHANISM, NOT ELSEWHERE CLASSIFIED (H): Status: ACTIVE | Noted: 2023-02-13

## 2023-02-13 PROCEDURE — 99213 OFFICE O/P EST LOW 20 MIN: CPT | Mod: CS | Performed by: FAMILY MEDICINE

## 2023-02-13 NOTE — TELEPHONE ENCOUNTER
Unable to complete RN treatment protocol due to history of increased hepatic enzymes.   Scheduled virtual visit with Dr. Bolanos. Glo Perez RN on 2/13/2023 at 9:41 AM                RN COVID TREATMENT VISIT  02/13/23    Chirag Gomez  67 year old  Current weight? 190#    Has the patient been seen by a primary care provider at an [facility] or [facility] within the past two years? Yes.   Have you been in close proximity to/do you have a known exposure to a person with a confirmed case of influenza? No.     Date of positive COVID test (PCR or at home)?  2/12//23    Current COVID symptoms: cough, headache, sore throat and congestion or runny nose    Date COVID symptoms began: 2/11/23    Do you have any of the following conditions that place you at risk of being very sick from COVID-19? 65 years or older    Is patient eligible to continue? Yes, established patient, 12 years or older weighing at least 88.2 lbs, who has COVID symptoms that started in the past 5 days and is at risk for being very sick from COVID-19.       Have you received monoclonal antibodies or oral antiviral medications since testing positive to COVID-19? No    Are you currently hospitalized for COVID-19? No    Do you have a history of hepatitis? No    Are you currently pregnant or nursing? No    Do you have a clinically significant hypersensitivity to nirmatrelvir, ritonavir, or molnupiravir? No    Do you have any history of severe renal impairment (eGFR < 30mL/min)? No    Do you have any history of hepatic impairment or abnormalities (e.g. hepatic panel, ALT, AST, ALK Phos, bilirubin)? YES    Have you had a coronary stent placed in the previous 6 months? No    Is patient eligible to continue? No, patient does not meet all eligibility requirements for the RN COVID treatment (as denoted by yes response(s) above). Patient informed they will need a virtual provider visit to assess treatment options.  Patient will be  transferred to a  at the end of this call.   Glo Perez RN                Reason for Disposition    [1] COVID-19 diagnosed by positive lab test (e.g., PCR, rapid self-test kit) AND [2] mild symptoms (e.g., cough, fever, others) AND [3] no complications or SOB    Additional Information    Negative: SEVERE difficulty breathing (e.g., struggling for each breath, speaks in single words)    Negative: Difficult to awaken or acting confused (e.g., disoriented, slurred speech)    Negative: Bluish (or gray) lips or face now    Negative: Shock suspected (e.g., cold/pale/clammy skin, too weak to stand, low BP, rapid pulse)    Negative: Sounds like a life-threatening emergency to the triager    Negative: [1] Diagnosed or suspected COVID-19 AND [2] symptoms lasting 3 or more weeks    Negative: [1] COVID-19 exposure AND [2] no symptoms    Negative: COVID-19 vaccine reaction suspected (e.g., fever, headache, muscle aches) occurring 1 to 3 days after getting vaccine    Negative: COVID-19 vaccine, questions about    Negative: [1] Lives with someone known to have influenza (flu test positive) AND [2] flu-like symptoms (e.g., cough, runny nose, sore throat, SOB; with or without fever)    Negative: [1] Adult with possible COVID-19 symptoms AND [2] triager concerned about severity of symptoms or other causes    Negative: COVID-19 and breastfeeding, questions about    Negative: SEVERE or constant chest pain or pressure  (Exception: Mild central chest pain, present only when coughing.)    Negative: MODERATE difficulty breathing (e.g., speaks in phrases, SOB even at rest, pulse 100-120)    Negative: Headache and stiff neck (can't touch chin to chest)    Negative: Oxygen level (e.g., pulse oximetry) 90 percent or lower    Negative: Chest pain or pressure  (Exception: MILD central chest pain, present only when coughing)    Negative: Patient sounds very sick or weak to the triager    Negative: MILD difficulty breathing (e.g.,  "minimal/no SOB at rest, SOB with walking, pulse <100)    Negative: Fever > 103 F (39.4 C)    Negative: [1] Fever > 101 F (38.3 C) AND [2] over 60 years of age    Negative: [1] Fever > 100.0 F (37.8 C) AND [2] bedridden (e.g., nursing home patient, CVA, chronic illness, recovering from surgery)    Negative: [1] HIGH RISK for severe COVID complications (e.g., weak immune system, age > 64 years, obesity with BMI of 30 or higher, pregnant, chronic lung disease or other chronic medical condition) AND [2] COVID symptoms (e.g., cough, fever)  (Exceptions: Already seen by PCP and no new or worsening symptoms.)    Negative: [1] HIGH RISK patient AND [2] influenza is widespread in the community AND [3] ONE OR MORE respiratory symptoms: cough, sore throat, runny or stuffy nose    Negative: [1] HIGH RISK patient AND [2] influenza exposure within the last 7 days AND [3] ONE OR MORE respiratory symptoms: cough, sore throat, runny or stuffy nose    Negative: Oxygen level (e.g., pulse oximetry) 91 to 94 percent    Negative: [1] COVID-19 infection suspected by caller or triager AND [2] mild symptoms (cough, fever, or others) AND [3] negative COVID-19 rapid test    Negative: Fever present > 3 days (72 hours)    Negative: [1] Fever returns after gone for over 24 hours AND [2] symptoms worse or not improved    Negative: [1] Continuous (nonstop) coughing interferes with work or school AND [2] no improvement using cough treatment per Care Advice    Negative: Cough present > 3 weeks    Answer Assessment - Initial Assessment Questions  1. COVID-19 DIAGNOSIS: \"Who made your COVID-19 diagnosis?\" \"Was it confirmed by a positive lab test or self-test?\" If not diagnosed by a doctor (or NP/PA), ask \"Are there lots of cases (community spread) where you live?\" Note: See public health department website, if unsure.      Home test   2. COVID-19 EXPOSURE: \"Was there any known exposure to COVID before the symptoms began?\" CDC Definition of close " "contact: within 6 feet (2 meters) for a total of 15 minutes or more over a 24-hour period.       Wife has covid  3. ONSET: \"When did the COVID-19 symptoms start?\"       2/11/23  4. WORST SYMPTOM: \"What is your worst symptom?\" (e.g., cough, fever, shortness of breath, muscle aches)      H/a head pressure sore throat  5. COUGH: \"Do you have a cough?\" If Yes, ask: \"How bad is the cough?\"        Yes. Not constant. Dry comes and goes.   6. FEVER: \"Do you have a fever?\" If Yes, ask: \"What is your temperature, how was it measured, and when did it start?\"      No.   7. RESPIRATORY STATUS: \"Describe your breathing?\" (e.g., shortness of breath, wheezing, unable to speak)       no  8. BETTER-SAME-WORSE: \"Are you getting better, staying the same or getting worse compared to yesterday?\"  If getting worse, ask, \"In what way?\"      worse  9. HIGH RISK DISEASE: \"Do you have any chronic medical problems?\" (e.g., asthma, heart or lung disease, weak immune system, obesity, etc.)      no  10. VACCINE: \"Have you had the COVID-19 vaccine?\" If Yes, ask: \"Which one, how many shots, when did you get it?\"        Yes. Bivalent.   11. BOOSTER: \"Have you received your COVID-19 booster?\" If Yes, ask: \"Which one and when did you get it?\"        Bilvalent in the fall.   12. PREGNANCY: \"Is there any chance you are pregnant?\" \"When was your last menstrual period?\"        no  13. OTHER SYMPTOMS: \"Do you have any other symptoms?\"  (e.g., chills, fatigue, headache, loss of smell or taste, muscle pain, sore throat)        Congestion.   14. O2 SATURATION MONITOR:  \"Do you use an oxygen saturation monitor (pulse oximeter) at home?\" If Yes, ask \"What is your reading (oxygen level) today?\" \"What is your usual oxygen saturation reading?\" (e.g., 95%)        no    Protocols used: CORONAVIRUS (COVID-19) DIAGNOSED OR [id number]      "

## 2023-02-13 NOTE — PROGRESS NOTES
"Trevin is a 67 year old who is being evaluated via a billable video visit.      How would you like to obtain your AVS? MyChart  If the video visit is dropped, the invitation should be resent by: Text to cell phone: [phone]  Will anyone else be joining your video visit? No          Assessment & Plan     Infection due to 2019 novel coronavirus  He is considered high risk due to age over 65.  Discussed treatment options and he would like prescription for Paxlovid.  Counseled him on this medication and prescription sent to pharmacy.  Continue with other symptomatic cares and follow-up or contact clinic if worsening of symptoms or new symptoms of concern develop.  - nirmatrelvir and ritonavir (PAXLOVID) therapy pack  Dispense: 30 tablet; Refill: 0               BMI:   Estimated body mass index is 26.67 kg/m  as calculated from the following:    Height as of 12/23/22: 1.803 m (5' 11\").    Weight as of 12/23/22: 86.7 kg (191 lb 3.2 oz).           No follow-ups on file.    Lynn Bolanos MD  [facility]   Trevin is a 67 year old, presenting for the following health issues:  Covid Concern (Covid positive 2-12-23, sore throat, cough and headache, feels like bad head cold. Treatment options)      HPI   She is evaluated today to discuss COVID treatment.  His wife tested positive for COVID middle of last week.  A couple of days ago he started developing some symptoms.  He did a home test on Saturday and it was negative.  Felt worse yesterday and retested again at home and the COVID test was positive.  States that generally he has been feeling pretty crummy.  He has had headache, sore throat and slight cough.  Has not had fever or shortness of breath.  He is fully vaccinated for COVID.  He is eating and drinking okay.  He is taking Tylenol and Sudafed.  Overall healthy.  Takes omeprazole.  No other medications.  He does have history of elevated liver enzymes back in 2018 when his gallbladder " was removed.  Liver enzymes have been normal since then and were most recently checked on 12/30/2022.  His renal function is normal.  No other concerns or questions today.  Review of systems is otherwise negative.        Review of Systems         Objective           Vitals:  No vitals were obtained today due to virtual visit.    Physical Exam   GENERAL: Healthy, alert and no distress  EYES: Eyes grossly normal to inspection.  No discharge or erythema, or obvious scleral/conjunctival abnormalities.  RESP: No audible wheeze, cough, or visible cyanosis.  No visible retractions or increased work of breathing.    SKIN: Visible skin clear. No significant rash, abnormal pigmentation or lesions.  NEURO: Cranial nerves grossly intact.  Mentation and speech appropriate for age.  PSYCH: Mentation appears normal, affect normal/bright, judgement and insight intact, normal speech and appearance well-groomed.                Video-Visit Details    Type of service:  Video Visit   Video Start Time: 9:49  Video End Time:9:56    Originating Location (pt. Location): Home    Distant Location (provider location):  On-site  Platform used for Video Visit: Maryann

## 2023-02-13 NOTE — TELEPHONE ENCOUNTER
Reason for call:  Symptom     Symptom or request: Covid    Duration (how long have symptoms been present): 1 day    Have you been treated for this before? No    Additional comments: Headache, sore throat, cough, congestion, feels like a really bad head cold.    Phone number to reach patient:  Cell number on file:    Telephone Information:   Mobile [phone]       Best Time:  Any    Can we leave a detailed message on this number?  YES

## 2023-04-20 ENCOUNTER — PATIENT OUTREACH (OUTPATIENT)
Dept: CARE COORDINATION | Facility: CLINIC | Age: 68
End: 2023-04-20
Payer: COMMERCIAL

## 2023-07-18 ASSESSMENT — ENCOUNTER SYMPTOMS
PALPITATIONS: 0
CHILLS: 0
HEARTBURN: 0
EYE PAIN: 1
JOINT SWELLING: 0
DYSURIA: 0
COUGH: 1
SHORTNESS OF BREATH: 0
NAUSEA: 0
MYALGIAS: 0
WEAKNESS: 0
ARTHRALGIAS: 0
ABDOMINAL PAIN: 0
HEADACHES: 0
DIZZINESS: 0
SORE THROAT: 0
NERVOUS/ANXIOUS: 0
DIARRHEA: 0
PARESTHESIAS: 0
CONSTIPATION: 0
FREQUENCY: 0
HEMATOCHEZIA: 0
HEMATURIA: 0
FEVER: 0

## 2023-07-18 ASSESSMENT — ACTIVITIES OF DAILY LIVING (ADL): CURRENT_FUNCTION: NO ASSISTANCE NEEDED

## 2023-07-24 PROBLEM — R12 HEARTBURN: Status: ACTIVE | Noted: 2023-01-10

## 2023-07-25 ENCOUNTER — OFFICE VISIT (OUTPATIENT)
Dept: FAMILY MEDICINE | Facility: CLINIC | Age: 68
End: 2023-07-25
Payer: COMMERCIAL

## 2023-07-25 VITALS
HEART RATE: 63 BPM | HEIGHT: 71 IN | TEMPERATURE: 97.7 F | WEIGHT: 184 LBS | OXYGEN SATURATION: 98 % | SYSTOLIC BLOOD PRESSURE: 128 MMHG | RESPIRATION RATE: 18 BRPM | DIASTOLIC BLOOD PRESSURE: 76 MMHG | BODY MASS INDEX: 25.76 KG/M2

## 2023-07-25 DIAGNOSIS — B35.6 TINEA CRURIS: ICD-10-CM

## 2023-07-25 DIAGNOSIS — R73.03 PREDIABETES: ICD-10-CM

## 2023-07-25 DIAGNOSIS — Z00.00 ROUTINE GENERAL MEDICAL EXAMINATION AT A HEALTH CARE FACILITY: Primary | ICD-10-CM

## 2023-07-25 DIAGNOSIS — E78.5 DYSLIPIDEMIA: ICD-10-CM

## 2023-07-25 DIAGNOSIS — R53.83 FATIGUE, UNSPECIFIED TYPE: ICD-10-CM

## 2023-07-25 DIAGNOSIS — R26.89 LOSS OF BALANCE: ICD-10-CM

## 2023-07-25 DIAGNOSIS — K21.9 GASTROESOPHAGEAL REFLUX DISEASE, UNSPECIFIED WHETHER ESOPHAGITIS PRESENT: ICD-10-CM

## 2023-07-25 DIAGNOSIS — Z12.5 SCREENING FOR PROSTATE CANCER: ICD-10-CM

## 2023-07-25 LAB
ALBUMIN SERPL BCG-MCNC: 4.5 G/DL (ref 3.5–5.2)
ALP SERPL-CCNC: 57 U/L (ref 40–129)
ALT SERPL W P-5'-P-CCNC: 17 U/L (ref 0–70)
ANION GAP SERPL CALCULATED.3IONS-SCNC: 9 MMOL/L (ref 7–15)
AST SERPL W P-5'-P-CCNC: 25 U/L (ref 0–45)
BASOPHILS # BLD AUTO: 0 10E3/UL (ref 0–0.2)
BASOPHILS NFR BLD AUTO: 1 %
BILIRUB SERPL-MCNC: 0.5 MG/DL
BUN SERPL-MCNC: 14.2 MG/DL (ref 8–23)
CALCIUM SERPL-MCNC: 9.3 MG/DL (ref 8.8–10.2)
CHLORIDE SERPL-SCNC: 105 MMOL/L (ref 98–107)
CHOLEST SERPL-MCNC: 184 MG/DL
CREAT SERPL-MCNC: 0.92 MG/DL (ref 0.67–1.17)
DEPRECATED HCO3 PLAS-SCNC: 27 MMOL/L (ref 22–29)
EOSINOPHIL # BLD AUTO: 0.5 10E3/UL (ref 0–0.7)
EOSINOPHIL NFR BLD AUTO: 8 %
ERYTHROCYTE [DISTWIDTH] IN BLOOD BY AUTOMATED COUNT: 12.5 % (ref 10–15)
GFR SERPL CREATININE-BSD FRML MDRD: >90 ML/MIN/1.73M2
GLUCOSE SERPL-MCNC: 105 MG/DL (ref 70–99)
HBA1C MFR BLD: 5.6 % (ref 0–5.6)
HCT VFR BLD AUTO: 45.1 % (ref 40–53)
HDLC SERPL-MCNC: 58 MG/DL
HGB BLD-MCNC: 15.4 G/DL (ref 13.3–17.7)
IMM GRANULOCYTES # BLD: 0 10E3/UL
IMM GRANULOCYTES NFR BLD: 0 %
LDLC SERPL CALC-MCNC: 107 MG/DL
LYMPHOCYTES # BLD AUTO: 1.1 10E3/UL (ref 0.8–5.3)
LYMPHOCYTES NFR BLD AUTO: 19 %
MAGNESIUM SERPL-MCNC: 2.2 MG/DL (ref 1.7–2.3)
MCH RBC QN AUTO: 31.8 PG (ref 26.5–33)
MCHC RBC AUTO-ENTMCNC: 34.1 G/DL (ref 31.5–36.5)
MCV RBC AUTO: 93 FL (ref 78–100)
MONOCYTES # BLD AUTO: 0.4 10E3/UL (ref 0–1.3)
MONOCYTES NFR BLD AUTO: 7 %
NEUTROPHILS # BLD AUTO: 3.7 10E3/UL (ref 1.6–8.3)
NEUTROPHILS NFR BLD AUTO: 65 %
NONHDLC SERPL-MCNC: 126 MG/DL
PLATELET # BLD AUTO: 262 10E3/UL (ref 150–450)
POTASSIUM SERPL-SCNC: 4.7 MMOL/L (ref 3.4–5.3)
PROT SERPL-MCNC: 7 G/DL (ref 6.4–8.3)
PSA SERPL DL<=0.01 NG/ML-MCNC: 1.06 NG/ML (ref 0–4.5)
RBC # BLD AUTO: 4.85 10E6/UL (ref 4.4–5.9)
SODIUM SERPL-SCNC: 141 MMOL/L (ref 136–145)
TRIGL SERPL-MCNC: 96 MG/DL
TSH SERPL DL<=0.005 MIU/L-ACNC: 1.32 UIU/ML (ref 0.3–4.2)
VIT B12 SERPL-MCNC: 396 PG/ML (ref 232–1245)
WBC # BLD AUTO: 5.7 10E3/UL (ref 4–11)

## 2023-07-25 PROCEDURE — 80053 COMPREHEN METABOLIC PANEL: CPT | Performed by: FAMILY MEDICINE

## 2023-07-25 PROCEDURE — 0134A COVID-19 BIVALENT 18+ (MODERNA): CPT | Performed by: FAMILY MEDICINE

## 2023-07-25 PROCEDURE — G0439 PPPS, SUBSEQ VISIT: HCPCS | Performed by: FAMILY MEDICINE

## 2023-07-25 PROCEDURE — 84443 ASSAY THYROID STIM HORMONE: CPT | Performed by: FAMILY MEDICINE

## 2023-07-25 PROCEDURE — 82607 VITAMIN B-12: CPT | Performed by: FAMILY MEDICINE

## 2023-07-25 PROCEDURE — 83735 ASSAY OF MAGNESIUM: CPT | Performed by: FAMILY MEDICINE

## 2023-07-25 PROCEDURE — G0103 PSA SCREENING: HCPCS | Performed by: FAMILY MEDICINE

## 2023-07-25 PROCEDURE — 99213 OFFICE O/P EST LOW 20 MIN: CPT | Mod: 25 | Performed by: FAMILY MEDICINE

## 2023-07-25 PROCEDURE — 36415 COLL VENOUS BLD VENIPUNCTURE: CPT | Performed by: FAMILY MEDICINE

## 2023-07-25 PROCEDURE — 83036 HEMOGLOBIN GLYCOSYLATED A1C: CPT | Performed by: FAMILY MEDICINE

## 2023-07-25 PROCEDURE — 80061 LIPID PANEL: CPT | Performed by: FAMILY MEDICINE

## 2023-07-25 PROCEDURE — 85025 COMPLETE CBC W/AUTO DIFF WBC: CPT | Performed by: FAMILY MEDICINE

## 2023-07-25 PROCEDURE — 91313 COVID-19 BIVALENT 18+ (MODERNA): CPT | Performed by: FAMILY MEDICINE

## 2023-07-25 RX ORDER — KETOCONAZOLE 20 MG/G
CREAM TOPICAL DAILY
Qty: 60 G | Refills: 1 | Status: SHIPPED | OUTPATIENT
Start: 2023-07-25

## 2023-07-25 ASSESSMENT — ACTIVITIES OF DAILY LIVING (ADL)
DIFFICULTY_EATING/SWALLOWING: NO
DRESSING/BATHING_DIFFICULTY: NO
HEARING_DIFFICULTY_OR_DEAF: NO
CHANGE_IN_FUNCTIONAL_STATUS_SINCE_ONSET_OF_CURRENT_ILLNESS/INJURY: NO
WEAR_GLASSES_OR_BLIND: YES
WALKING_OR_CLIMBING_STAIRS_DIFFICULTY: NO
FALL_HISTORY_WITHIN_LAST_SIX_MONTHS: NO
TOILETING_ISSUES: NO
CONCENTRATING,_REMEMBERING_OR_MAKING_DECISIONS_DIFFICULTY: NO
DOING_ERRANDS_INDEPENDENTLY_DIFFICULTY: NO
DIFFICULTY_COMMUNICATING: NO

## 2023-07-25 ASSESSMENT — PAIN SCALES - GENERAL: PAINLEVEL: NO PAIN (0)

## 2023-07-25 NOTE — PROGRESS NOTES
SUBJECTIVE:   Trevin is a 67 year old who presents for Preventive Visit.        Are you in the first 12 months of your Medicare coverage?  No    He is acid reflux. This past winter underwent endoscopy which discovered yet hiatal hernia but no other significant concerning findings. Symptoms currently controlled with omeprazole. Discussed eventually backing off on the PPI and trying to change over to and H2 blockers but for the time being will continue. Reviewed the potential dangers of long-term PPI use.    He had COVID this past winter has made a complete recovery. Discussed updating COVID booster vaccine per current recommendations.    Today we reviewed basic health prevention and screening. See orders below.    We discussed a few specific concerns as follows:    He finds he is more tired he falls asleep easily. He is sleeping an adequate number of hours typically eight between 10 PM and 6 AM. He gets to sleep all right he does not typically wake up often during the night if he does he gets back to sleep quickly. He often wakes feeling refreshed and rested he does not snore. Later in the day especially in the afternoon after eating he feels extremely tired and will fall asleep much more easily. Does not fall asleep at inopportune times such as when driving or doing other important activities. He does drink alcohol usually two beers per day on average we discussed alcohol. He does have some stress anxiety and depression type symptoms but seems to be relatively mild. We discussed ways of managing these. He does exercise regularly. He does not find himself getting out of breath or having chest pain or becoming dizzy or lightheaded with exercise. Generally feels well when he is active. Today we discussed a multitude of considerations starting with lab testing, optimization of sleep, reduction of alcohol as a place to start. May need to consider further evaluation beyond and discussed this as well.    He notes more  "difficulties with balance, no concerning falls or other concerning things. Nothing to suggest an underlying movement disorder or other similar concerns. Recommend continued activity and increasing breadth of types of activity. Will check labs to make sure that there is not an undiscovered problem, recommend continued keeping up-to-date with eye exams to optimize vision and vision correction which it sounds like he is doing. Will consider further evaluation if necessary.      Have you ever done Advance Care Planning? (For example, a Health Directive, POLST, or a discussion with a medical provider or your loved ones about your wishes): Yes, advance care planning is on file.        Fallen 2 or more times in the past year?: No  Any fall with injury in the past year?: No  Answers submitted by the patient for this visit:  Annual Preventive Visit (Submitted on 7/18/2023)  Chief Complaint: Annual Exam:  In general, how would you rate your overall physical health?: good  Frequency of exercise:: 4-5 days/week  Do you usually eat at least 4 servings of fruit and vegetables a day, include whole grains & fiber, and avoid regularly eating high fat or \"junk\" foods? : Yes  Taking medications regularly:: Yes  Medication side effects:: None  Activities of Daily Living: no assistance needed  Home safety: no safety concerns identified  Hearing Impairment:: difficulty following a conversation in a noisy restaurant or crowded room, feel that people are mumbling or not speaking clearly, need to ask people to speak up or repeat themselves  In the past 6 months, have you been bothered by leaking of urine?: No  In general, how would you rate your overall mental or emotional health?: good  Additional concerns today:: No  Exercise outside of work (Submitted on 7/18/2023)  Chief Complaint: Annual Exam:  Duration of exercise:: 30-45 minutes    Cognitive Screening   1) Repeat 3 items: 3/3  2) Clock draw: NORMALRecalls 3 objects  3) 3 item recall: " 3/3  Results: 3 items recalled: COGNITIVE IMPAIRMENT LESS LIKELY    Mini-CogTM Copyright MIGUE Richardson. Licensed by the author for use in [facility]; reprinted with permission (grace@[facility]). All rights reserved.      Do you have sleep apnea, excessive snoring or daytime drowsiness? : no    Reviewed and updated as needed this visit by clinical staff      Reviewed and updated as needed this visit by Provider    Social History     Tobacco Use     Smoking status: Never     Passive exposure: Never     Smokeless tobacco: Never   Substance Use Topics     Alcohol use: Yes     Alcohol/week: 12.0 standard drinks of alcohol             7/18/2023     1:19 PM   Alcohol Use   Prescreen: >3 drinks/day or >7 drinks/week? No     Do you have a current opioid prescription? No  Do you use any other controlled substances or medications that are not prescribed by a provider? None          Current providers sharing in care for this patient include:   Patient Care Team:  George Oliver MD as PCP - General (Family Practice)  George Oliver MD as Assigned PCP    The following health maintenance items are reviewed in Epic and correct as of today:  Health Maintenance   Topic Date Due     COVID-19 Vaccine (6 - Moderna series) 02/28/2023     MEDICARE ANNUAL WELLNESS VISIT  05/03/2023     ANNUAL REVIEW OF HM ORDERS  05/03/2023     INFLUENZA VACCINE (1) 09/01/2023     FALL RISK ASSESSMENT  07/25/2024     ADVANCE CARE PLANNING  04/19/2026     COLORECTAL CANCER SCREENING  03/29/2027     LIPID  05/03/2027     DTAP/TDAP/TD IMMUNIZATION (3 - Td or Tdap) 05/03/2032     HEPATITIS C SCREENING  Completed     PHQ-2 (once per calendar year)  Completed     Pneumococcal Vaccine: 65+ Years  Completed     AORTIC ANEURYSM SCREENING (SYSTEM ASSIGNED)  Completed     ZOSTER IMMUNIZATION  Addressed     IPV IMMUNIZATION  Aged Out     MENINGITIS IMMUNIZATION  Aged Out               Review of Systems  Complete review of systems is obtained.  Other than  "the specific considerations noted above complete review of systems is negative.      OBJECTIVE:   /76   Pulse 63   Temp 97.7  F (36.5  C)   Resp 18   Ht 1.803 m (5' 11\")   Wt 83.5 kg (184 lb)   SpO2 98%   BMI 25.66 kg/m   Estimated body mass index is 25.66 kg/m  as calculated from the following:    Height as of this encounter: 1.803 m (5' 11\").    Weight as of this encounter: 83.5 kg (184 lb).  Physical Exam          General Appearance:    Alert, cooperative, no distress   Eyes:   No scleral icterus or conjunctival irritation       Ears:    Normal TM's and external ear canals, both ears   Throat:   Lips, mucosa, and tongue normal; teeth and gums normal   Neck:   Supple, symmetrical, trachea midline, no adenopathy;        thyroid:  No enlargement/tenderness/nodules   Lungs:     Clear to auscultation bilaterally, respirations unlabored, no wheezes or crackles   Heart:    Regular rate and rhythm,  No murmur   Abdomen:    Soft, no distention, no tenderness on palpation, no masses, no organomegaly     Extremities:  No edema, no joint swelling or redness, no evidence of any injuries   Skin:  No concerning skin findings, no suspicious moles, no rashes   Neurologic:  On gross examination there is no motor or sensory deficit.  Patient walks with a normal gait     Genitalia:  Tinea cruris without signs of secondary infection most prominent on the left side             ASSESSMENT / PLAN:   Chirag was seen today for wellness visit and recheck medication.    Diagnoses and all orders for this visit:    Gastroesophageal reflux disease, unspecified whether esophagitis present  -     Comprehensive metabolic panel (BMP + Alb, Alk Phos, ALT, AST, Total. Bili, TP)  -     CBC with Platelets & Differential  -     Magnesium    Fatigue, unspecified type  -     Vitamin B12  -     TSH with free T4 reflex  -     CBC with Platelets & Differential    Routine general medical examination at a health care facility    Screening for " prostate cancer  -     PSA, screen    Dyslipidemia  -     Lipid panel reflex to direct LDL Fasting  -     Comprehensive metabolic panel (BMP + Alb, Alk Phos, ALT, AST, Total. Bili, TP)    Prediabetes  -     Hemoglobin A1c    Loss of balance    Tinea cruris  -     ketoconazole (NIZORAL) 2 % external cream; Apply topically daily    Other orders  -     COVID-19 BIVALENT 18+ (MODERNA)           Patient has been advised of split billing requirements and indicates understanding: Yes      COUNSELING:  Reviewed preventive health counseling, as reflected in patient instructions       Regular exercise       Healthy diet/nutrition       Vision screening       Hearing screening       Dental care       Colon cancer screening       Prostate cancer screening        He reports that he has never smoked. He has never been exposed to tobacco smoke. He has never used smokeless tobacco.      Appropriate preventive services were discussed with this patient, including applicable screening as appropriate for cardiovascular disease, diabetes, osteopenia/osteoporosis, and glaucoma.  As appropriate for age/gender, discussed screening for colorectal cancer, prostate cancer, breast cancer, and cervical cancer. Checklist reviewing preventive services available has been given to the patient.    Reviewed patients plan of care and provided an AVS. The Basic Care Plan (routine screening as documented in Health Maintenance) for Chirag meets the Care Plan requirement. This Care Plan has been established and reviewed with the Patient.          George Oliver MD, MD  [facility]    Identified Health Risks:

## 2023-10-02 ENCOUNTER — IMMUNIZATION (OUTPATIENT)
Dept: NURSING | Facility: CLINIC | Age: 68
End: 2023-10-02
Payer: COMMERCIAL

## 2023-10-02 PROCEDURE — 90662 IIV NO PRSV INCREASED AG IM: CPT

## 2023-10-02 PROCEDURE — G0008 ADMIN INFLUENZA VIRUS VAC: HCPCS

## 2023-12-15 ENCOUNTER — TRANSFERRED RECORDS (OUTPATIENT)
Dept: HEALTH INFORMATION MANAGEMENT | Facility: CLINIC | Age: 68
End: 2023-12-15
Payer: COMMERCIAL

## 2024-03-01 ENCOUNTER — TRANSFERRED RECORDS (OUTPATIENT)
Dept: HEALTH INFORMATION MANAGEMENT | Facility: CLINIC | Age: 69
End: 2024-03-01
Payer: COMMERCIAL

## 2024-03-11 ENCOUNTER — TRANSFERRED RECORDS (OUTPATIENT)
Dept: HEALTH INFORMATION MANAGEMENT | Facility: CLINIC | Age: 69
End: 2024-03-11
Payer: COMMERCIAL

## 2024-03-27 NOTE — PROGRESS NOTES
Assessment and Plan:     Patient has been advised of split billing requirements and indicates understanding: Yes  Chirag was seen today for annual wellness visit, knee pain, immunizations and follow-up.    Hyperglycemia  -     Glycosylated Hemoglobin A1c    Screening for prostate cancer  -     PSA, Annual Screen (Prostatic-Specific Antigen)    Dyslipidemia  -     Comprehensive Metabolic Panel  -     Lipid [place] FASTING    Routine general medical examination at a health care facility  -     Hepatitis C Antibody (Anti-HCV)    Weight loss  -     Thyroid [place]    Encounter for general adult medical examination with abnormal findings    Other orders  -     Varicella Zoster, Recombinant Vaccine IM  -     Pneumococcal polysaccharide vaccine 23-valent 1 yo or older, subq/IM        The patient's current medical problems were reviewed.    The following high BMI interventions were performed this visit: encouragement to exercise  The following health maintenance schedule was reviewed with the patient and provided in printed form in the after visit summary:   Health Maintenance Due   Topic Date Due     HEPATITIS C SCREENING  Never done     Pneumococcal Vaccine: 65+ Years (2 of 2 - PPSV23) 02/11/2021     TD 18+ HE  03/09/2022        Subjective:   Chief Complaint: Chirag Gomez is an 65 y.o. male here for an Annual Wellness visit.   HPI: This past year underwent shoulder surgery he reports he has done well in that regard.  Weight is down slightly, wife is somewhat concerned but he attributes this to decreasing alcohol consumption.  Overall states he feels well.  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  He has had PSA testing for prostate cancer screening, his previous baseline in 2018 had been around 0.5.  Numbers in 2020 were up to 1.9 they were followed more closely his most recent numbers 1.1 with 3 data points showing stability.  Discussed continued use of PSA test,  no indication to proceed with further evaluation at this time.  Reviewed most recent colonoscopy report.  Reviewed immunizations.  He has prediabetes most recent A1c of 5.8.  Discussed the importance of follow-up there.  Discussed other aspects of exercise nutrition and health maintenance.  He has some right knee pain consistent with prepatellar bursitis.  Reports some hearing difficulty likely caused by cerumen impaction, discussed more specifically management of earwax.  Review of Systems:    Please see above.  The rest of the review of systems are negative for all systems.    Patient Care Team:  George Oliver MD as PCP - General  George Oliver MD as Assigned PCP     Patient Active Problem List   Diagnosis     Esophageal reflux     Acute Bronchitis     Lumbar Radiculopathy     Cholecystitis     Elevated BP     Calculus of bile duct with acute cholecystitis without obstruction     Encounter for screening for malignant neoplasm of colon     Reflux esophagitis     Rectal polyp     Polyp of colon     Diaphragmatic hernia     Gastroesophageal reflux disease     Esophagitis     Stricture and stenosis of esophagus     Special screening for malignant neoplasms, colon     Diverticular disease of large intestine     Diverticulosis of large intestine without perforation or abscess without bleeding     Nonspecific abdominal pain     History of cholecystectomy     Right flank pain     Right upper quadrant pain     Past Medical History:   Diagnosis Date     GERD (gastroesophageal reflux disease)       Past Surgical History:   Procedure Laterality Date     HYDROCELE EXCISION / REPAIR Right 3/12/2018    Procedure: RIGHT HYDROCELECTOMY;  Surgeon: Cristo Gaston MD;  Location: [facility];  Service:      LAPAROSCOPIC CHOLECYSTECTOMY N/A 12/6/2016    Procedure: CHOLECYSTECTOMY LAPAROSCOPIC;  Surgeon: Layton Dill MD;  Location: [facility];  Service:       No family history on file.   Social History  "    Socioeconomic History     Marital status:      Spouse name: Not on file     Number of children: Not on file     Years of education: Not on file     Highest education level: Not on file   Occupational History     Not on file   Social Needs     Financial resource strain: Not on file     Food insecurity     Worry: Not on file     Inability: Not on file     Transportation needs     Medical: Not on file     Non-medical: Not on file   Tobacco Use     Smoking status: Never Smoker     Smokeless tobacco: Never Used   Substance and Sexual Activity     Alcohol use: Yes     Alcohol/week: 12.0 standard drinks     Types: 12 Cans of beer per week     Drug use: No     Sexual activity: Not on file   Lifestyle     Physical activity     Days per week: Not on file     Minutes per session: Not on file     Stress: Not on file   Relationships     Social connections     Talks on phone: Not on file     Gets together: Not on file     Attends Judaism service: Not on file     Active member of club or organization: Not on file     Attends meetings of clubs or organizations: Not on file     Relationship status: Not on file     Intimate partner violence     Fear of current or ex partner: Not on file     Emotionally abused: Not on file     Physically abused: Not on file     Forced sexual activity: Not on file   Other Topics Concern     Not on file   Social History Narrative     Not on file      Current Outpatient Medications   Medication Sig Dispense Refill     omeprazole (PRILOSEC) 20 MG capsule Take 20 mg by mouth daily before breakfast.       No current facility-administered medications for this visit.       Objective:   Vital Signs:   Visit Vitals  /74   Pulse 65   Ht 5' 11\" (1.803 m)   Wt 190 lb 6.4 oz (86.4 kg)   SpO2 97%   BMI 26.56 kg/m       VisionScreening:  No exam data present     PHYSICAL EXAM    General Appearance:    Alert, cooperative, no distress   Eyes:   No scleral icterus or conjunctival irritation     "   Ears:    Normal TM's and external ear canals, both ears   Throat:   Lips, mucosa, and tongue normal; teeth and gums normal   Neck:   Supple, symmetrical, trachea midline, no adenopathy;        thyroid:  No enlargement/tenderness/nodules   Lungs:     Clear to auscultation bilaterally, respirations unlabored, no wheezes or crackles   Heart:    Regular rate and rhythm,  No murmur   Abdomen:    Soft, no distention, no tenderness on palpation, no masses, no organomegaly     Extremities:  No edema, no joint swelling or redness, no evidence of any injuries   Skin:  No concerning skin findings, no suspicious moles, no rashes   Neurologic:  On gross examination there is no motor or sensory deficit.  Patient walks with a normal gait     Genitalia:   Normal testicular anatomy, no inguinal hernias, no skin findings in the genital region   Rectal:    Normal tone, no hemorrhoids masses or other anal rectal findings, prostate has a smooth uniform consistency without nodules       Assessment Results 4/19/2021   Activities of Daily Living No help needed   Instrumental Activities of Daily Living No help needed   Mini Cog Total Score 5   Some recent data might be hidden     A Mini-Cog score of 0-2 suggests the possibility of dementia, score of 3-5 suggests no dementia    Identified Health Risks:     The patient was provided with written information regarding signs of hearing loss.  He is at risk for falling and has been provided with information to reduce the risk of falling at home.  Patient's advanced directive was discussed and I am comfortable with the patient's wishes.         Detail Level: Zone

## 2024-06-12 SDOH — HEALTH STABILITY: PHYSICAL HEALTH: ON AVERAGE, HOW MANY DAYS PER WEEK DO YOU ENGAGE IN MODERATE TO STRENUOUS EXERCISE (LIKE A BRISK WALK)?: 5 DAYS

## 2024-06-12 SDOH — HEALTH STABILITY: PHYSICAL HEALTH: ON AVERAGE, HOW MANY MINUTES DO YOU ENGAGE IN EXERCISE AT THIS LEVEL?: 60 MIN

## 2024-06-12 ASSESSMENT — SOCIAL DETERMINANTS OF HEALTH (SDOH): HOW OFTEN DO YOU GET TOGETHER WITH FRIENDS OR RELATIVES?: TWICE A WEEK

## 2024-06-17 ENCOUNTER — OFFICE VISIT (OUTPATIENT)
Dept: FAMILY MEDICINE | Facility: CLINIC | Age: 69
End: 2024-06-17
Payer: COMMERCIAL

## 2024-06-17 VITALS
BODY MASS INDEX: 26.07 KG/M2 | SYSTOLIC BLOOD PRESSURE: 130 MMHG | HEART RATE: 63 BPM | WEIGHT: 186.2 LBS | TEMPERATURE: 98.3 F | DIASTOLIC BLOOD PRESSURE: 78 MMHG | RESPIRATION RATE: 18 BRPM | HEIGHT: 71 IN | OXYGEN SATURATION: 98 %

## 2024-06-17 DIAGNOSIS — K21.9 GASTROESOPHAGEAL REFLUX DISEASE, UNSPECIFIED WHETHER ESOPHAGITIS PRESENT: ICD-10-CM

## 2024-06-17 DIAGNOSIS — Z12.5 SCREENING FOR PROSTATE CANCER: ICD-10-CM

## 2024-06-17 DIAGNOSIS — Z00.00 ROUTINE GENERAL MEDICAL EXAMINATION AT A HEALTH CARE FACILITY: Primary | ICD-10-CM

## 2024-06-17 DIAGNOSIS — E78.5 DYSLIPIDEMIA: ICD-10-CM

## 2024-06-17 DIAGNOSIS — R73.03 PREDIABETES: ICD-10-CM

## 2024-06-17 DIAGNOSIS — K44.9 HIATAL HERNIA: ICD-10-CM

## 2024-06-17 LAB
CHOLEST SERPL-MCNC: 175 MG/DL
FASTING STATUS PATIENT QL REPORTED: YES
FASTING STATUS PATIENT QL REPORTED: YES
GLUCOSE SERPL-MCNC: 104 MG/DL (ref 70–99)
HBA1C MFR BLD: 5.7 % (ref 0–5.6)
HDLC SERPL-MCNC: 53 MG/DL
LDLC SERPL CALC-MCNC: 104 MG/DL
NONHDLC SERPL-MCNC: 122 MG/DL
PSA SERPL DL<=0.01 NG/ML-MCNC: 3.11 NG/ML (ref 0–4.5)
TRIGL SERPL-MCNC: 91 MG/DL

## 2024-06-17 PROCEDURE — G0439 PPPS, SUBSEQ VISIT: HCPCS | Performed by: FAMILY MEDICINE

## 2024-06-17 PROCEDURE — 82947 ASSAY GLUCOSE BLOOD QUANT: CPT | Performed by: FAMILY MEDICINE

## 2024-06-17 PROCEDURE — 80061 LIPID PANEL: CPT | Performed by: FAMILY MEDICINE

## 2024-06-17 PROCEDURE — 83036 HEMOGLOBIN GLYCOSYLATED A1C: CPT | Performed by: FAMILY MEDICINE

## 2024-06-17 PROCEDURE — 36415 COLL VENOUS BLD VENIPUNCTURE: CPT | Performed by: FAMILY MEDICINE

## 2024-06-17 PROCEDURE — G0103 PSA SCREENING: HCPCS | Performed by: FAMILY MEDICINE

## 2024-06-17 ASSESSMENT — ACTIVITIES OF DAILY LIVING (ADL)
DIFFICULTY_COMMUNICATING: NO
CONCENTRATING,_REMEMBERING_OR_MAKING_DECISIONS_DIFFICULTY: NO
WALKING_OR_CLIMBING_STAIRS_DIFFICULTY: NO
WEAR_GLASSES_OR_BLIND: YES
FALL_HISTORY_WITHIN_LAST_SIX_MONTHS: NO
DRESSING/BATHING_DIFFICULTY: NO
DOING_ERRANDS_INDEPENDENTLY_DIFFICULTY: NO
CHANGE_IN_FUNCTIONAL_STATUS_SINCE_ONSET_OF_CURRENT_ILLNESS/INJURY: NO
HEARING_DIFFICULTY_OR_DEAF: NO
TOILETING_ISSUES: NO
DIFFICULTY_EATING/SWALLOWING: NO

## 2024-06-17 ASSESSMENT — PAIN SCALES - GENERAL: PAINLEVEL: NO PAIN (0)

## 2024-06-17 NOTE — PROGRESS NOTES
Preventive Care Visit  [facility]  George Oliver MD, MD, Family Medicine  Jun 17, 2024      Trevin was seen today for recheck medication and wellness visit.    Diagnoses and all orders for this visit:    Routine general medical examination at a health care facility    Gastroesophageal reflux disease, unspecified whether esophagitis present    Screening for prostate cancer  -     PSA, screen; Future  -     PSA, screen    Dyslipidemia  -     Lipid panel reflex to direct LDL Fasting; Future  -     Lipid panel reflex to direct LDL Fasting    Prediabetes  -     Hemoglobin A1c; Future  -     Glucose; Future  -     Hemoglobin A1c  -     Glucose    Hiatal hernia           Subjective   Trevin is a 68 year old, presenting for the following:  Recheck Medication and Wellness Visit        6/17/2024     9:00 AM   Additional Questions   Roomed by am cma     His acid reflux with a history of hiatal hernia on endoscopy which was performed just over a year ago.  He did taper off of his PPI but has noted a epigastric discomfort that occurs sporadically.  It is not associated with definitive acid reflux symptoms like he had experienced before nor does he have other concerning symptoms such as shortness of breath nor does it occur with activity.  Seems to be related to acid irritation likely from his hiatal hernia, we discussed restarting his omeprazole and monitoring closely.  Discussed the trade off between controlling symptoms with the PPI versus the potential long-term side effect risks.  At this point it is clear he should be on the PPI.    He does have nasal congestion and it is consistent with allergic rhinitis discussed medication treatment.    Last year we had discussed concerns regarding fatigue, he reports getting adequate sleep he has less concern at this time.  Last year we discussed balance difficulty does not report as a concern at this time he states overall he feels well.    Reviewed routine health  preventive measures including prostate cancer screening, colon cancer screening and appropriate immunizations.        Health Care Directive  Patient does not have a Health Care Directive or Living Will: Advance Directive received and scanned. Click on Code in the patient header to view.      Do you have a current opioid prescription? no  Do you use any other controlled substances or medications that are not prescribed by a provider?  no            6/12/2024   General Health   How would you rate your overall physical health? Good   Feel stress (tense, anxious, or unable to sleep) Only a little   (!) STRESS CONCERN      6/12/2024   Nutrition   Diet: Regular (no restrictions)         6/12/2024   Exercise   Days per week of moderate/strenous exercise 5 days   Average minutes spent exercising at this level 60 min         6/12/2024   Social Factors   Frequency of gathering with friends or relatives Twice a week   Worry food won't last until get money to buy more No   Food not last or not have enough money for food? No   Do you have housing?  Yes   Are you worried about losing your housing? No   Lack of transportation? No   Unable to get utilities (heat,electricity)? No         6/17/2024   Fall Risk   Fallen 2 or more times in the past year? No   Trouble with walking or balance? No          6/12/2024   Activities of Daily Living- Home Safety   Needs help with the following daily activites None of the above   Safety concerns in the home None of the above         6/12/2024   Dental   Dentist two times every year? (!) NO         6/12/2024   Hearing Screening   Hearing concerns? None of the above         6/12/2024   Driving Risk Screening   Patient/family members have concerns about driving No         6/12/2024   General Alertness/Fatigue Screening   Have you been more tired than usual lately? No         6/12/2024   Urinary Incontinence Screening   Bothered by leaking urine in past 6 months No         6/12/2024   TB Screening    Were you born outside of the [place]? No         Today's PHQ-2 Score:       6/16/2024     1:53 PM   PHQ-2 ( 1999 [employer])   Q1: Little interest or pleasure in doing things 0   Q2: Feeling down, depressed or hopeless 0   PHQ-2 Score 0   Q1: Little interest or pleasure in doing things Not at all   Q2: Feeling down, depressed or hopeless Not at all   PHQ-2 Score 0           6/12/2024   Substance Use   Alcohol more than 3/day or more than 7/wk No   Do you have a current opioid prescription? No   How severe/bad is pain from 1 to 10? 1/10   Do you use any other substances recreationally? (!) ALCOHOL     Social History     Tobacco Use    Smoking status: Never     Passive exposure: Never    Smokeless tobacco: Never   Vaping Use    Vaping status: Never Used   Substance Use Topics    Alcohol use: Yes     Alcohol/week: 12.0 standard drinks of alcohol    Drug use: No           6/12/2024   AAA Screening   Family history of Abdominal Aortic Aneurysm (AAA)? No   Last PSA:   Prostate Specific Antigen Screen   Date Value Ref Range Status   07/25/2023 1.06 0.00 - 4.50 ng/mL Final   05/03/2022 0.55 0.00 - 4.50 ug/L Final     ASCVD Risk   The 10-year ASCVD risk score (Ramu DOSS, et al., 2019) is: 15.5%    Values used to calculate the score:      Age: 68 years      Sex: Male      Is Non- : No      Diabetic: No      Tobacco smoker: No      Systolic Blood Pressure: 130 mmHg      Is BP treated: No      HDL Cholesterol: 58 mg/dL      Total Cholesterol: 184 mg/dL            Reviewed and updated as needed this visit by Provider                      Current providers sharing in care for this patient include:  Patient Care Team:  George Oliver MD as PCP - General (Family Practice)  George Oliver MD as Assigned PCP    The following health maintenance items are reviewed in Epic and correct as of today:  Health Maintenance   Topic Date Due    RSV VACCINE (Pregnancy & 60+) (1 - 1-dose 60+ series) Never done     "ANNUAL REVIEW OF HM ORDERS  05/03/2023    COVID-19 Vaccine (7 - 2023-24 season) 09/19/2023    MEDICARE ANNUAL WELLNESS VISIT  07/25/2024    FALL RISK ASSESSMENT  06/17/2025    GLUCOSE  07/25/2026    COLORECTAL CANCER SCREENING  03/29/2027    LIPID  07/25/2028    ADVANCE CARE PLANNING  07/25/2028    DTAP/TDAP/TD IMMUNIZATION (3 - Td or Tdap) 05/03/2032    HEPATITIS C SCREENING  Completed    PHQ-2 (once per calendar year)  Completed    INFLUENZA VACCINE  Completed    Pneumococcal Vaccine: 65+ Years  Completed    ZOSTER IMMUNIZATION  Addressed    IPV IMMUNIZATION  Aged Out    HPV IMMUNIZATION  Aged Out    MENINGITIS IMMUNIZATION  Aged Out    RSV MONOCLONAL ANTIBODY  Aged Out     Complete review of systems is obtained.  Other than the specific considerations noted above complete review of systems is negative.     Objective    Exam  /78   Pulse 63   Temp 98.3  F (36.8  C)   Resp 18   Ht 1.803 m (5' 11\")   Wt 84.5 kg (186 lb 3.2 oz)   SpO2 98%   BMI 25.97 kg/m     Estimated body mass index is 25.97 kg/m  as calculated from the following:    Height as of this encounter: 1.803 m (5' 11\").    Weight as of this encounter: 84.5 kg (186 lb 3.2 oz).    Physical Exam    General Appearance:    Alert, cooperative, no distress   Eyes:   No scleral icterus or conjunctival irritation       Ears:    Normal TM's and external ear canals, both ears   Throat:   Lips, mucosa, and tongue normal; teeth and gums normal   Neck:   Supple, symmetrical, trachea midline, no adenopathy;        thyroid:  No enlargement/tenderness/nodules   Lungs:     Clear to auscultation bilaterally, respirations unlabored, no wheezes or crackles   Heart:    Regular rate and rhythm,  No murmur   Abdomen:    Soft, no distention, no tenderness on palpation, no masses, no organomegaly     Extremities:  No edema, no joint swelling or redness, no evidence of any injuries   Skin:  No concerning skin findings, no suspicious moles, no rashes   Neurologic:  On " gross examination there is no motor or sensory deficit.  Patient walks with a normal gait             6/17/2024   Mini Cog   Clock Draw Score 2 Normal   3 Item Recall 3 objects recalled   Mini Cog Total Score 5              Signed Electronically by: George Oliver MD, MD

## 2024-06-23 DIAGNOSIS — Z12.5 SCREENING FOR PROSTATE CANCER: Primary | ICD-10-CM

## 2024-07-08 ENCOUNTER — E-VISIT (OUTPATIENT)
Dept: URGENT CARE | Facility: CLINIC | Age: 69
End: 2024-07-08
Payer: COMMERCIAL

## 2024-07-08 DIAGNOSIS — J30.9 ALLERGIC RHINITIS WITH POSTNASAL DRIP: Primary | ICD-10-CM

## 2024-07-08 DIAGNOSIS — R09.82 ALLERGIC RHINITIS WITH POSTNASAL DRIP: Primary | ICD-10-CM

## 2024-07-08 PROCEDURE — 99421 OL DIG E/M SVC 5-10 MIN: CPT | Performed by: NURSE PRACTITIONER

## 2024-07-08 RX ORDER — SOD CHLOR,SOD BICARB/NETI POT
PACKET, WITH RINSE DEVICE NASAL
Qty: 50 EACH | Refills: 1 | Status: SHIPPED | OUTPATIENT
Start: 2024-07-08

## 2024-07-08 RX ORDER — CETIRIZINE HYDROCHLORIDE 10 MG/1
10 TABLET ORAL DAILY
Qty: 30 TABLET | Refills: 0 | Status: SHIPPED | OUTPATIENT
Start: 2024-07-08

## 2024-07-08 RX ORDER — FLUTICASONE PROPIONATE 50 MCG
1 SPRAY, SUSPENSION (ML) NASAL DAILY
Qty: 16 G | Refills: 0 | Status: SHIPPED | OUTPATIENT
Start: 2024-07-08

## 2024-09-23 ENCOUNTER — LAB (OUTPATIENT)
Dept: LAB | Facility: CLINIC | Age: 69
End: 2024-09-23
Payer: COMMERCIAL

## 2024-09-23 DIAGNOSIS — Z12.5 SCREENING FOR PROSTATE CANCER: ICD-10-CM

## 2024-09-23 DIAGNOSIS — Z00.00 HEALTH CARE MAINTENANCE: Primary | ICD-10-CM

## 2024-09-23 LAB
HOLD SPECIMEN: NORMAL
PSA SERPL DL<=0.01 NG/ML-MCNC: 1.45 NG/ML (ref 0–4.5)

## 2024-09-23 PROCEDURE — 36415 COLL VENOUS BLD VENIPUNCTURE: CPT

## 2024-09-23 PROCEDURE — G0103 PSA SCREENING: HCPCS

## 2024-10-07 ENCOUNTER — HOSPITAL ENCOUNTER (OUTPATIENT)
Dept: ULTRASOUND IMAGING | Facility: CLINIC | Age: 69
Discharge: HOME | End: 2024-10-07
Attending: FAMILY MEDICINE | Admitting: FAMILY MEDICINE
Payer: COMMERCIAL

## 2024-10-07 ENCOUNTER — OFFICE VISIT (OUTPATIENT)
Dept: FAMILY MEDICINE | Facility: CLINIC | Age: 69
End: 2024-10-07
Payer: COMMERCIAL

## 2024-10-07 VITALS
DIASTOLIC BLOOD PRESSURE: 88 MMHG | WEIGHT: 186 LBS | RESPIRATION RATE: 16 BRPM | OXYGEN SATURATION: 98 % | HEART RATE: 58 BPM | BODY MASS INDEX: 26.04 KG/M2 | HEIGHT: 71 IN | TEMPERATURE: 97.1 F | SYSTOLIC BLOOD PRESSURE: 136 MMHG

## 2024-10-07 DIAGNOSIS — M79.89 LEFT LEG SWELLING: Primary | ICD-10-CM

## 2024-10-07 DIAGNOSIS — M79.89 LEFT LEG SWELLING: ICD-10-CM

## 2024-10-07 DIAGNOSIS — M70.52 INFRAPATELLAR BURSITIS OF LEFT KNEE: ICD-10-CM

## 2024-10-07 PROCEDURE — 93971 EXTREMITY STUDY: CPT | Mod: LT

## 2024-10-07 PROCEDURE — 90662 IIV NO PRSV INCREASED AG IM: CPT | Performed by: FAMILY MEDICINE

## 2024-10-07 PROCEDURE — 90480 ADMN SARSCOV2 VAC 1/ONLY CMP: CPT | Performed by: FAMILY MEDICINE

## 2024-10-07 PROCEDURE — G0008 ADMIN INFLUENZA VIRUS VAC: HCPCS | Performed by: FAMILY MEDICINE

## 2024-10-07 PROCEDURE — 99214 OFFICE O/P EST MOD 30 MIN: CPT | Mod: 25 | Performed by: FAMILY MEDICINE

## 2024-10-07 PROCEDURE — 91320 SARSCV2 VAC 30MCG TRS-SUC IM: CPT | Performed by: FAMILY MEDICINE

## 2024-10-07 NOTE — PROGRESS NOTES
"Chirag Gomez  /88   Pulse 58   Temp 97.1  F (36.2  C) (Temporal)   Resp 16   Ht 1.803 m (5' 11\")   Wt 84.4 kg (186 lb)   SpO2 98%   BMI 25.94 kg/m       Assessment/Plan:                Trevin was seen today for leg injury.    Diagnoses and all orders for this visit:    Left leg swelling  -     US Lower Extremity Venous Duplex Left; Future    Other orders  -     INFLUENZA HIGH DOSE, TRIVALENT, PF (FLUZONE)  -     COVID-19 12+ (PFIZER)         DISCUSSION  Rule out blood clot causing swelling in lower leg.  Knee pain is indicative of bursitis, symptoms are mild no evidence of any other significant injury to the knee or surrounding area otherwise.  Will send him for an ultrasound to be done today and based on that result we will decide on further course of action.  This might include initiation of treatment for blood clot if present otherwise would treat more symptomatic swelling and discussed this today.    Addendum: Ultrasound does not show any evidence of DVT.  Will continue to manage swelling and more conservative means and monitor for any more significant symptoms which could prompt the need for further evaluation.  Message left for patient at this point we will try to confirm  Subjective:     HPI:    Chirag Gomez is a 68 year old male he fell while hiking about 2 weeks ago.  States he was able to get back up but had pain in the knee.  This was accompanied by swelling in the front of the knee.  No redness of her developed.  Patient states he has had some occasional swelling toward the end of the day noted with indentation of a sock line in the left leg only even prior to this fall with injury.  Since that time it has become more pronounced and more constant.  He denies chest pain shortness of breath or other concerns.  He has no history of blood clot.    ROS:  Complete review of systems is obtained.  Other than the specific considerations noted above complete review of systems is " negative.          Objective:   Medications:  Current Outpatient Medications   Medication Sig Dispense Refill    cetirizine (ZYRTEC) 10 MG tablet Take 1 tablet (10 mg) by mouth daily 30 tablet 0    fluticasone (FLONASE) 50 MCG/ACT nasal spray Spray 1 spray into both nostrils daily 16 g 0    Sodium Chloride-Sodium Bicarb (AYR SALINE NASAL NETI RINSE) 1.57 g PACK Nasal rinse daily 50 each 1    ketoconazole (NIZORAL) 2 % external cream Apply topically daily 60 g 1    omeprazole (PRILOSEC) 20 MG capsule Take 20 mg by mouth 2 times daily       No current facility-administered medications for this visit.        Allergies:   No Known Allergies     Social History     Socioeconomic History    Marital status:      Spouse name: Not on file    Number of children: Not on file    Years of education: Not on file    Highest education level: Not on file   Occupational History    Not on file   Tobacco Use    Smoking status: Never     Passive exposure: Never    Smokeless tobacco: Never   Vaping Use    Vaping status: Never Used   Substance and Sexual Activity    Alcohol use: Yes     Alcohol/week: 12.0 standard drinks of alcohol    Drug use: No    Sexual activity: Not on file   Other Topics Concern    Not on file   Social History Narrative    Not on file     Social Determinants of Health     Financial Resource Strain: Low Risk  (6/12/2024)    Financial Resource Strain     Within the past 12 months, have you or your family members you live with been unable to get utilities (heat, electricity) when it was really needed?: No   Food Insecurity: Low Risk  (6/12/2024)    Food Insecurity     Within the past 12 months, did you worry that your food would run out before you got money to buy more?: No     Within the past 12 months, did the food you bought just not last and you didn t have money to get more?: No   Transportation Needs: Low Risk  (6/12/2024)    Transportation Needs     Within the past 12 months, has lack of transportation  kept you from medical appointments, getting your medicines, non-medical meetings or appointments, work, or from getting things that you need?: No   Physical Activity: Sufficiently Active (6/12/2024)    Exercise Vital Sign     Days of Exercise per Week: 5 days     Minutes of Exercise per Session: 60 min   Stress: No Stress Concern Present (6/12/2024)    [employer] [place] [employer] - Occupational Stress Questionnaire     Feeling of Stress : Only a little   Social Connections: Unknown (6/12/2024)    Social Connection and Isolation Panel [NHANES]     Frequency of Communication with Friends and Family: Not on file     Frequency of Social Gatherings with Friends and Family: Twice a week     Attends Congregational Services: Not on file     Active Member of Clubs or Organizations: Not on file     Attends Club or Organization Meetings: Not on file     Marital Status: Not on file   Interpersonal Safety: Low Risk  (6/17/2024)    Interpersonal Safety     Do you feel physically and emotionally safe where you currently live?: Yes     Within the past 12 months, have you been hit, slapped, kicked or otherwise physically hurt by someone?: No     Within the past 12 months, have you been humiliated or emotionally abused in other ways by your partner or ex-partner?: No   Housing Stability: Low Risk  (6/12/2024)    Housing Stability     Do you have housing? : Yes     Are you worried about losing your housing?: No       No family history on file.     Most Recent Immunizations   Administered Date(s) Administered    COVID-19 Bivalent 18+ (Moderna) 07/25/2023    COVID-19 Monovalent 18+ (Moderna) 11/02/2021    COVID-19 Monovalent Booster 18+ (Moderna) 05/03/2022    DT (PEDS <7y) 01/18/2006    Flu, Unspecified 10/01/2017    Influenza (High Dose) Trivalent,PF (Fluzone) 10/02/2023    Influenza (IIV3) PF 11/29/2012    Influenza Vaccine 18-64 (Flublok) 10/08/2019    Influenza Vaccine 65+ (Fluzone HD) 10/02/2023    Influenza Vaccine, 6+MO  "IM (QUADRIVALENT W/PRESERVATIVES) 10/02/2018    Pneumo Conj 13-V (2010&after) 02/11/2020    Pneumococcal 23 valent 04/19/2021    TD,PF 7+ (Tenivac) 01/18/2006    TDAP (Adacel,Boostrix) 05/03/2022    Td,adult,historic,unspecified 01/18/2006    Zoster recombinant adjuvanted (SHINGRIX) 04/19/2021   Pended Date(s) Pended    COVID-19 12+ (Pfizer) 10/07/2024    Influenza (High Dose) Trivalent,PF (Fluzone) 10/07/2024        Wt Readings from Last 3 Encounters:   10/07/24 84.4 kg (186 lb)   06/17/24 84.5 kg (186 lb 3.2 oz)   07/25/23 83.5 kg (184 lb)        BP Readings from Last 6 Encounters:   10/07/24 136/88   06/17/24 130/78   07/25/23 128/76   12/23/22 128/76   05/03/22 126/76   04/19/21 124/74        Hemoglobin A1C   Date Value Ref Range Status   06/17/2024 5.7 (H) 0.0 - 5.6 % Final     Comment:     Normal <5.7%   Prediabetes 5.7-6.4%    Diabetes 6.5% or higher     Note: Adopted from ADA consensus guidelines.   07/25/2023 5.6 0.0 - 5.6 % Final     Comment:     Normal <5.7%   Prediabetes 5.7-6.4%    Diabetes 6.5% or higher     Note: Adopted from ADA consensus guidelines.   05/03/2022 5.6 0.0 - 5.6 % Final     Comment:     Normal <5.7%   Prediabetes 5.7-6.4%    Diabetes 6.5% or higher     Note: Adopted from ADA consensus guidelines.              PHYSICAL EXAM:    /88   Pulse 58   Temp 97.1  F (36.2  C) (Temporal)   Resp 16   Ht 1.803 m (5' 11\")   Wt 84.4 kg (186 lb)   SpO2 98%   BMI 25.94 kg/m       General: Patient no signs of distress    Examination of his left leg reveals there is focal swelling just distal to the patella itself consistent with likely prepatellar bursitis.  There is no associated redness no bruising is seen.  Palpation around the area elicits only mild tenderness over the fluid sac.  No joint line tenderness.  No laxity of the joint.  Good range of motion is noted.  Below the level of the knee there is mild pitting edema.  Skin is warm to the touch with good capillary refill being noted.  " No significant tenderness otherwise noted in the lower leg.                          Answers submitted by the patient for this visit:  General Questionnaire (Submitted on 10/5/2024)  Chief Complaint: Chronic problems general questions HPI Form  General Concern (Submitted on 10/5/2024)  Chief Complaint: Chronic problems general questions HPI Form  What is the reason for your visit today?: Left leg swollen  When did your symptoms begin?: More than a month  How would you describe these symptoms?: Mild  Are your symptoms:: Staying the same  Have you had these symptoms before?: No

## 2024-12-18 ENCOUNTER — MYC MEDICAL ADVICE (OUTPATIENT)
Dept: FAMILY MEDICINE | Facility: CLINIC | Age: 69
End: 2024-12-18
Payer: COMMERCIAL

## 2024-12-18 DIAGNOSIS — R10.13 ABDOMINAL PAIN, EPIGASTRIC: ICD-10-CM

## 2024-12-18 DIAGNOSIS — R63.0 POOR APPETITE: ICD-10-CM

## 2024-12-18 DIAGNOSIS — R53.81 MALAISE: ICD-10-CM

## 2024-12-18 DIAGNOSIS — R63.4 WEIGHT LOSS: Primary | ICD-10-CM

## 2024-12-26 ENCOUNTER — HOSPITAL ENCOUNTER (OUTPATIENT)
Dept: CT IMAGING | Facility: HOSPITAL | Age: 69
End: 2024-12-26
Attending: FAMILY MEDICINE
Payer: COMMERCIAL

## 2024-12-26 DIAGNOSIS — R63.4 WEIGHT LOSS: ICD-10-CM

## 2024-12-26 DIAGNOSIS — R63.0 POOR APPETITE: ICD-10-CM

## 2024-12-26 DIAGNOSIS — R53.81 MALAISE: ICD-10-CM

## 2024-12-26 DIAGNOSIS — R10.13 ABDOMINAL PAIN, EPIGASTRIC: ICD-10-CM

## 2024-12-26 PROCEDURE — 74177 CT ABD & PELVIS W/CONTRAST: CPT

## 2024-12-26 PROCEDURE — 250N000011 HC RX IP 250 OP 636: Performed by: FAMILY MEDICINE

## 2024-12-26 RX ORDER — IOPAMIDOL 755 MG/ML
90 INJECTION, SOLUTION INTRAVASCULAR ONCE
Status: COMPLETED | OUTPATIENT
Start: 2024-12-26 | End: 2024-12-26

## 2024-12-26 RX ADMIN — IOPAMIDOL 90 ML: 755 INJECTION, SOLUTION INTRAVENOUS at 14:06

## 2025-02-21 ENCOUNTER — APPOINTMENT (OUTPATIENT)
Dept: RADIOLOGY | Facility: CLINIC | Age: 70
End: 2025-02-21
Attending: EMERGENCY MEDICINE
Payer: COMMERCIAL

## 2025-02-21 ENCOUNTER — HOSPITAL ENCOUNTER (EMERGENCY)
Facility: CLINIC | Age: 70
Discharge: HOME | End: 2025-02-21
Attending: EMERGENCY MEDICINE
Payer: COMMERCIAL

## 2025-02-21 VITALS
RESPIRATION RATE: 20 BRPM | BODY MASS INDEX: 25.06 KG/M2 | HEIGHT: 71 IN | HEART RATE: 58 BPM | DIASTOLIC BLOOD PRESSURE: 90 MMHG | WEIGHT: 179 LBS | SYSTOLIC BLOOD PRESSURE: 143 MMHG | TEMPERATURE: 97.6 F | OXYGEN SATURATION: 100 %

## 2025-02-21 DIAGNOSIS — R07.9 NONSPECIFIC CHEST PAIN: ICD-10-CM

## 2025-02-21 LAB
ALBUMIN SERPL BCG-MCNC: 4.4 G/DL (ref 3.5–5.2)
ALP SERPL-CCNC: 82 U/L (ref 40–150)
ALT SERPL W P-5'-P-CCNC: 45 U/L (ref 0–70)
ANION GAP SERPL CALCULATED.3IONS-SCNC: 8 MMOL/L (ref 7–15)
AST SERPL W P-5'-P-CCNC: 109 U/L (ref 0–45)
ATRIAL RATE - MUSE: 69 BPM
BASOPHILS # BLD AUTO: 0 10E3/UL (ref 0–0.2)
BASOPHILS NFR BLD AUTO: 1 %
BILIRUB DIRECT SERPL-MCNC: 0.21 MG/DL (ref 0–0.3)
BILIRUB SERPL-MCNC: 0.5 MG/DL
BUN SERPL-MCNC: 13.1 MG/DL (ref 8–23)
CALCIUM SERPL-MCNC: 9.5 MG/DL (ref 8.8–10.4)
CHLORIDE SERPL-SCNC: 105 MMOL/L (ref 98–107)
CREAT SERPL-MCNC: 0.98 MG/DL (ref 0.67–1.17)
DIASTOLIC BLOOD PRESSURE - MUSE: 95 MMHG
EGFRCR SERPLBLD CKD-EPI 2021: 83 ML/MIN/1.73M2
EOSINOPHIL # BLD AUTO: 0.2 10E3/UL (ref 0–0.7)
EOSINOPHIL NFR BLD AUTO: 4 %
ERYTHROCYTE [DISTWIDTH] IN BLOOD BY AUTOMATED COUNT: 13 % (ref 10–15)
GLUCOSE SERPL-MCNC: 121 MG/DL (ref 70–99)
HCO3 SERPL-SCNC: 28 MMOL/L (ref 22–29)
HCT VFR BLD AUTO: 44.3 % (ref 40–53)
HGB BLD-MCNC: 15.1 G/DL (ref 13.3–17.7)
IMM GRANULOCYTES # BLD: 0 10E3/UL
IMM GRANULOCYTES NFR BLD: 0 %
INTERPRETATION ECG - MUSE: NORMAL
LYMPHOCYTES # BLD AUTO: 0.2 10E3/UL (ref 0.8–5.3)
LYMPHOCYTES NFR BLD AUTO: 4 %
MCH RBC QN AUTO: 31.1 PG (ref 26.5–33)
MCHC RBC AUTO-ENTMCNC: 34.1 G/DL (ref 31.5–36.5)
MCV RBC AUTO: 91 FL (ref 78–100)
MONOCYTES # BLD AUTO: 0.4 10E3/UL (ref 0–1.3)
MONOCYTES NFR BLD AUTO: 7 %
NEUTROPHILS # BLD AUTO: 5.2 10E3/UL (ref 1.6–8.3)
NEUTROPHILS NFR BLD AUTO: 85 %
NRBC # BLD AUTO: 0 10E3/UL
NRBC BLD AUTO-RTO: 0 /100
P AXIS - MUSE: 55 DEGREES
PLATELET # BLD AUTO: 268 10E3/UL (ref 150–450)
POTASSIUM SERPL-SCNC: 4.6 MMOL/L (ref 3.4–5.3)
PR INTERVAL - MUSE: 138 MS
PROT SERPL-MCNC: 6.9 G/DL (ref 6.4–8.3)
QRS DURATION - MUSE: 88 MS
QT - MUSE: 386 MS
QTC - MUSE: 413 MS
R AXIS - MUSE: 70 DEGREES
RBC # BLD AUTO: 4.86 10E6/UL (ref 4.4–5.9)
SODIUM SERPL-SCNC: 141 MMOL/L (ref 135–145)
SYSTOLIC BLOOD PRESSURE - MUSE: 186 MMHG
T AXIS - MUSE: 65 DEGREES
TROPONIN T SERPL HS-MCNC: 7 NG/L
TROPONIN T SERPL HS-MCNC: <6 NG/L
VENTRICULAR RATE- MUSE: 69 BPM
WBC # BLD AUTO: 6.1 10E3/UL (ref 4–11)

## 2025-02-21 PROCEDURE — 85004 AUTOMATED DIFF WBC COUNT: CPT | Performed by: EMERGENCY MEDICINE

## 2025-02-21 PROCEDURE — 71045 X-RAY EXAM CHEST 1 VIEW: CPT

## 2025-02-21 PROCEDURE — 99285 EMERGENCY DEPT VISIT HI MDM: CPT | Mod: 25 | Performed by: EMERGENCY MEDICINE

## 2025-02-21 PROCEDURE — 84484 ASSAY OF TROPONIN QUANT: CPT | Performed by: EMERGENCY MEDICINE

## 2025-02-21 PROCEDURE — 36415 COLL VENOUS BLD VENIPUNCTURE: CPT | Performed by: EMERGENCY MEDICINE

## 2025-02-21 PROCEDURE — 82248 BILIRUBIN DIRECT: CPT | Performed by: EMERGENCY MEDICINE

## 2025-02-21 PROCEDURE — 82947 ASSAY GLUCOSE BLOOD QUANT: CPT | Performed by: EMERGENCY MEDICINE

## 2025-02-21 PROCEDURE — 82565 ASSAY OF CREATININE: CPT | Performed by: EMERGENCY MEDICINE

## 2025-02-21 ASSESSMENT — COLUMBIA-SUICIDE SEVERITY RATING SCALE - C-SSRS
1. IN THE PAST MONTH, HAVE YOU WISHED YOU WERE DEAD OR WISHED YOU COULD GO TO SLEEP AND NOT WAKE UP?: NO
2. HAVE YOU ACTUALLY HAD ANY THOUGHTS OF KILLING YOURSELF IN THE PAST MONTH?: NO
6. HAVE YOU EVER DONE ANYTHING, STARTED TO DO ANYTHING, OR PREPARED TO DO ANYTHING TO END YOUR LIFE?: NO

## 2025-02-21 ASSESSMENT — ACTIVITIES OF DAILY LIVING (ADL)
ADLS_ACUITY_SCORE: 41

## 2025-02-21 NOTE — ED TRIAGE NOTES
Patient arrived via [facility] from home with complains of dizziness  and mid sternal chest pain. Patient was in his car and about to go [place] for family visit when he experienced mid-sternal chest pain, nausea, drooling and left arm tingling. Pt has hx GERD. Upon arrival pt denies chest pain, nausea or dizziness.

## 2025-02-21 NOTE — ED NOTES
Bed: [facility]-02  Expected date: 2/21/25  Expected time: 8:14 AM  Means of arrival: Ambulance  Comments:  [facility]   70 yo male  Dizzy/nauseated/yellow

## 2025-02-21 NOTE — ED PROVIDER NOTES
EMERGENCY DEPARTMENT ENCOUNTER      NAME: Chirag Gomez  AGE: 69 year old male  YOB: 1955  MRN: [id number]  EVALUATION DATE & TIME: 2025  8:21 AM    PCP: George Oliver    ED PROVIDER: Layton Morgan D.O.      Chief Complaint   Patient presents with    Dizziness    Chest Pain       FINAL IMPRESSION:  1. Nonspecific chest pain        ED COURSE & MEDICAL DECISION MAKIN:35 AM I met with the patient to gather history and to perform my initial exam. I discussed the plan for care while in the Emergency Department.  9:56 AM I spoke with Dr. Pisano from cardiology. He recommends discharge and follow up in rapid access cardiology clinic.  11:32 AM I rechecked and updated the patient on results. We discussed the plan for discharge and the patient is agreeable. Reviewed supportive cares, symptomatic treatment, outpatient follow up, and reasons to return to the Emergency Department. All questions and concerns were addressed. Patient to be discharged by ED RN.          Pertinent Labs & Imaging studies reviewed. (See chart for details)  69 year old male presents to the Emergency Department for evaluation of chest pain.   initial differential did include ACS, PE, dissection, pneumothorax, reflux, other acute process.  Patient was not tachycardic or hypoxic, and other than age was otherwise PERC negative, doubt PE at this time.  EKG did not show any evidence of ischemia arrhythmia, 2 troponins both came back negative.  Chest x-ray did not show any evidence of mediastinal widening that would be suggestive dissection, nor is or evidence of pneumothorax or consolidation suggestive of pneumonia.  I did consult with cardiology, at this time with 2 negative troponins, we do believe it prudent to discharge this patient home with outpatient follow-up with the rapid access clinic.  There is low likelihood that this represents ACS, and I am more suspicious for reflux especially with his known previous history.  " Of note, he did have a mildly elevated AST over his ALT, however when I discussed this with the patient he does report that he had a few alcoholic drinks yesterday, and I would suspect this is the underlying cause of this elevation and does not represent other emergent process.  At this time, plan is for discharge home with outpatient follow-up.  Patient was comfortable with this plan.  He is pain-free at this time.  Return precautions were discussed.    Medical Decision Making  Obtained supplemental history:Supplemental history obtained?: Family Member/Significant Other  Reviewed external records: External records reviewed?: No  Care impacted by chronic illness:Documented in Chart  Did you consider but not order tests?: Work up considered but not performed and documented in chart, if applicable  Did you interpret images independently?: Independent interpretation of ECG and images noted in documentation, when applicable.  Consultation discussion with other provider:Did you involve another provider (consultant, MH, pharmacy, etc.)?: I discussed the care with another health care provider, see documentation for details.  Discharge. No recommendations on prescription strength medication(s). I considered admission, but discharged the patient after share decision making conversation.    MIPS (CTPE, Dental pain, Scott, Sinusitis, Asthma/COPD, Head Trauma): Not Applicable        At the conclusion of the encounter I discussed the results of all of the tests and the disposition. The questions were answered. The patient or family acknowledged understanding and was agreeable with the care plan.        HPI    Patient information was obtained from: patient and wife    Use of : N/A      Chirag Gomez is a 69 year old male who presents from home for evaluation of chest pain.     The patient had just started driving to [place] to visit his brother in the hospital when he developed \"tremendous\" central chest pain. He " "adds that this pain was \"nothing like I've ever had\" and was \"like the worst case of GERD ever\". He has a history of GERD and has had severe GERD in the past but this was worse than any previous times. While having this chest pain he had nausea, excessive saliva with drooling, lightheadedness, mild shortness of breath, and left arm tingling. He continued to have these symptoms for the whole drive home and they only started to resolve once EMS arrived. By the time EMS performed their EKG his pain had nearly resolved. Now, he has no pain or symptoms. He ate coffee and toast this morning. He rarely drinks alcohol and does not use tobacco. No recent surgeries.     He denies fever, cough, congestion, vomiting, diarrhea, or any other complaints at this time.       PAST MEDICAL HISTORY:  Past Medical History:   Diagnosis Date    GERD (gastroesophageal reflux disease)        PAST SURGICAL HISTORY:  Past Surgical History:   Procedure Laterality Date    HYDROCELECTOMY SCROTAL Right 3/12/2018    Procedure: RIGHT HYDROCELECTOMY;  Surgeon: Cristo Gaston MD;  Location: [facility];  Service:     LAPAROSCOPIC CHOLECYSTECTOMY N/A 12/6/2016    Procedure: CHOLECYSTECTOMY LAPAROSCOPIC;  Surgeon: Layton Dill MD;  Location: [facility];  Service:          CURRENT MEDICATIONS:    No current facility-administered medications for this encounter.     Current Outpatient Medications   Medication Sig Dispense Refill    omeprazole 20 MG tablet Take 20 mg by mouth daily.           ALLERGIES:  No Known Allergies    FAMILY HISTORY:  No family history on file.    SOCIAL HISTORY:  Social History     Socioeconomic History    Marital status:    Tobacco Use    Smoking status: Never     Passive exposure: Never    Smokeless tobacco: Never   Vaping Use    Vaping status: Never Used   Substance and Sexual Activity    Alcohol use: Yes     Alcohol/week: 12.0 standard drinks of alcohol    Drug use: No     Social Drivers of Health "     Financial Resource Strain: Low Risk  (6/12/2024)    Financial Resource Strain     Within the past 12 months, have you or your family members you live with been unable to get utilities (heat, electricity) when it was really needed?: No   Food Insecurity: Low Risk  (6/12/2024)    Food Insecurity     Within the past 12 months, did you worry that your food would run out before you got money to buy more?: No     Within the past 12 months, did the food you bought just not last and you didn t have money to get more?: No   Transportation Needs: Low Risk  (6/12/2024)    Transportation Needs     Within the past 12 months, has lack of transportation kept you from medical appointments, getting your medicines, non-medical meetings or appointments, work, or from getting things that you need?: No   Physical Activity: Sufficiently Active (6/12/2024)    Exercise Vital Sign     Days of Exercise per Week: 5 days     Minutes of Exercise per Session: 60 min   Stress: No Stress Concern Present (6/12/2024)    [employer] - Occupational Stress Questionnaire     Feeling of Stress : Only a little   Social Connections: Unknown (6/12/2024)    Social Connection and Isolation Panel [NHANES]     Frequency of Social Gatherings with Friends and Family: Twice a week   Interpersonal Safety: Low Risk  (6/17/2024)    Interpersonal Safety     Do you feel physically and emotionally safe where you currently live?: Yes     Within the past 12 months, have you been hit, slapped, kicked or otherwise physically hurt by someone?: No     Within the past 12 months, have you been humiliated or emotionally abused in other ways by your partner or ex-partner?: No   Housing Stability: Low Risk  (6/12/2024)    Housing Stability     Do you have housing? : Yes     Are you worried about losing your housing?: No       VITALS:  Patient Vitals for the past 24 hrs:   BP Temp Temp src Pulse Resp SpO2 Height Weight   02/21/25 1211 -- -- -- 58 20  "100 % -- --   02/21/25 1200 (!) 143/90 -- -- 109 19 100 % -- --   02/21/25 1020 (!) 165/81 -- -- 56 22 -- -- --   02/21/25 0901 (!) 166/87 -- -- 60 16 99 % -- --   02/21/25 0826 (!) 186/95 97.6  F (36.4  C) Oral 65 18 98 % 1.803 m (5' 11\") 81.2 kg (179 lb)       PHYSICAL EXAM    VITAL SIGNS: BP (!) 143/90   Pulse 58   Temp 97.6  F (36.4  C) (Oral)   Resp 20   Ht 1.803 m (5' 11\")   Wt 81.2 kg (179 lb)   SpO2 100%   BMI 24.97 kg/m      General Appearance: Well-appearing, well-nourished, no acute distress   Head:  Normocephalic, without obvious abnormality, atraumatic  Eyes:  PERRL, conjunctiva/corneas clear, EOM's intact,  ENT:  Lips, mucosa, and tongue normal, membranes are moist without pallor  Neck:  Normal ROM, symmetrical, trachea midline    Cardio:  Regular rate and rhythm, no murmur, rub or gallop, 2+ pulses symmetric in all extremities  Pulm:  Clear to auscultation bilaterally, respirations unlabored,  Abdomen:  Soft, non-tender, no rebound or guarding.  Musculoskeletal: Full ROM, no edema, no cyanosis, good ROM of major joints  Integument:  Warm, Dry, No erythema, No rash.    Neurologic:  Alert & oriented.  No focal deficits appreciated.    Psychiatric:  Affect normal, Judgment normal, Mood normal.      LABS  Results for orders placed or performed during the hospital encounter of 02/21/25 (from the past 24 hours)   ECG 12-LEAD WITH MUSE (LHE)   Result Value Ref Range    Systolic Blood Pressure 186 mmHg    Diastolic Blood Pressure 95 mmHg    Ventricular Rate 69 BPM    Atrial Rate 69 BPM    HI Interval 138 ms    QRS Duration 88 ms     ms    QTc 413 ms    P Axis 55 degrees    R AXIS 70 degrees    T Axis 65 degrees    Interpretation ECG       Sinus rhythm with sinus arrhythmia  Normal ECG  When compared with ECG of 23-Dec-2022 09:00,  No significant change was found  Confirmed by SEE ED PROVIDER NOTE FOR, ECG INTERPRETATION ([id number]),  ROSEANN CHO ([id number]) on 2/21/2025 12:31:51 PM     CBC with " platelets + differential    Narrative    The following orders were created for panel order CBC with platelets + differential.  Procedure                               Abnormality         Status                     ---------                               -----------         ------                     CBC with platelets and d...[[id number]]  Abnormal            Final result                 Please view results for these tests on the individual orders.   Basic metabolic panel   Result Value Ref Range    Sodium 141 135 - 145 mmol/L    Potassium 4.6 3.4 - 5.3 mmol/L    Chloride 105 98 - 107 mmol/L    Carbon Dioxide (CO2) 28 22 - 29 mmol/L    Anion Gap 8 7 - 15 mmol/L    Urea Nitrogen 13.1 8.0 - 23.0 mg/dL    Creatinine 0.98 0.67 - 1.17 mg/dL    GFR Estimate 83 >60 mL/min/1.73m2    Calcium 9.5 8.8 - 10.4 mg/dL    Glucose 121 (H) 70 - 99 mg/dL   Hepatic function panel   Result Value Ref Range    Protein Total 6.9 6.4 - 8.3 g/dL    Albumin 4.4 3.5 - 5.2 g/dL    Bilirubin Total 0.5 <=1.2 mg/dL    Alkaline Phosphatase 82 40 - 150 U/L     (H) 0 - 45 U/L    ALT 45 0 - 70 U/L    Bilirubin Direct 0.21 0.00 - 0.30 mg/dL   Troponin T, High Sensitivity   Result Value Ref Range    Troponin T, High Sensitivity 7 <=22 ng/L   CBC with platelets and differential   Result Value Ref Range    WBC Count 6.1 4.0 - 11.0 10e3/uL    RBC Count 4.86 4.40 - 5.90 10e6/uL    Hemoglobin 15.1 13.3 - 17.7 g/dL    Hematocrit 44.3 40.0 - 53.0 %    MCV 91 78 - 100 fL    MCH 31.1 26.5 - 33.0 pg    MCHC 34.1 31.5 - 36.5 g/dL    RDW 13.0 10.0 - 15.0 %    Platelet Count 268 150 - 450 10e3/uL    % Neutrophils 85 %    % Lymphocytes 4 %    % Monocytes 7 %    % Eosinophils 4 %    % Basophils 1 %    % Immature Granulocytes 0 %    NRBCs per 100 WBC 0 <1 /100    Absolute Neutrophils 5.2 1.6 - 8.3 10e3/uL    Absolute Lymphocytes 0.2 (L) 0.8 - 5.3 10e3/uL    Absolute Monocytes 0.4 0.0 - 1.3 10e3/uL    Absolute Eosinophils 0.2 0.0 - 0.7 10e3/uL    Absolute  Basophils 0.0 0.0 - 0.2 10e3/uL    Absolute Immature Granulocytes 0.0 <=0.4 10e3/uL    Absolute NRBCs 0.0 10e3/uL   XR Chest Port 1 View    Narrative    EXAM: XR CHEST PORT 1 VIEW  LOCATION: [facility]  DATE: 2/21/2025    INDICATION: Chest pain  COMPARISON: 12/23/2022      Impression    IMPRESSION: Negative chest. Calcified lymph nodes again seen in the left hilum, unchanged.   Troponin T, High Sensitivity   Result Value Ref Range    Troponin T, High Sensitivity <6 <=22 ng/L         RADIOLOGY  XR Chest Port 1 View   Final Result   IMPRESSION: Negative chest. Calcified lymph nodes again seen in the left hilum, unchanged.             EKG:    Rate: 69 bpm  Rhythm: Normal Sinus Rhythm  Axis: Normal  Interval: Normal  Conduction: Normal  QRS: Normal  ST: Normal  T-wave: Normal  QT: Not prolonged  Comparison EKG: No significant change when compared to 23 December 2022  Impression:  No acute ischemic change   I have independently reviewed and interpreted today's EKG, pending Cardiologist read          MEDICATIONS GIVEN IN THE EMERGENCY:  Medications - No data to display    NEW PRESCRIPTIONS STARTED AT TODAY'S ER VISIT  Discharge Medication List as of 2/21/2025 12:12 PM           I, Ariel De Leon, am serving as a scribe to document services personally performed by Layton Morgan D.O., based on my observations and the provider's statements to me.  I, Layton Morgan D.O., attest that Ariel De Leon is acting in a scribe capacity, has observed my performance of the services and has documented them in accordance with my direction.     Layton Morgan D.O.  Emergency Medicine  [facility] EMERGENCY ROOM  [address] [address]  [phone]  Dept: [phone]       Layton Morgan,   02/21/25 [id number]

## 2025-02-24 ENCOUNTER — PATIENT OUTREACH (OUTPATIENT)
Dept: FAMILY MEDICINE | Facility: CLINIC | Age: 70
End: 2025-02-24
Payer: COMMERCIAL

## 2025-02-24 NOTE — TELEPHONE ENCOUNTER
Transitions of Care Outreach  Chief Complaint   Patient presents with    Hospital F/U       Most Recent Admission Date: 2/21/2025   Most Recent Admission Diagnosis:      Most Recent Discharge Date: 2/21/2025   Most Recent Discharge Diagnosis: Nonspecific chest pain - R07.9     Transitions of Care Assessment    Discharge Assessment  How are you doing now that you are home?: Feeling alright, currently in [place]  How are your symptoms? (Red Flag symptoms escalate to triage hotline per guidelines): Improved  Do you know how to contact your clinic care team if you have future questions or changes to your health status? : Yes  Does the patient have their discharge instructions? : Yes  Does the patient have questions regarding their discharge instructions? : No  Were you started on any new medications or were there changes to any of your previous medications? : No  Does the patient have all of their medications?: Yes  Do you have questions regarding any of your medications? : No    Follow up Plan     Discharge Follow-Up  Discharge follow up appointment scheduled in alignment with recommended follow up timeframe or Transitions of Risk Category? (Low = within 30 days; Moderate= within 14 days; High= within 7 days): Yes  Discharge Follow Up Appointment Date: 03/03/25  Discharge Follow Up Appointment Scheduled with?: Primary Care Provider    Future Appointments   Date Time Provider Department Center   3/3/2025 11:20 AM George Oliver MD [facility]       Outpatient Plan as outlined on AVS reviewed with patient.    For any urgent concerns, please contact our 24 hour nurse triage line: [phone] ([phone])       Nayely Hernandez RN

## 2025-03-03 ENCOUNTER — OFFICE VISIT (OUTPATIENT)
Dept: FAMILY MEDICINE | Facility: CLINIC | Age: 70
End: 2025-03-03
Payer: COMMERCIAL

## 2025-03-03 VITALS
OXYGEN SATURATION: 98 % | RESPIRATION RATE: 18 BRPM | TEMPERATURE: 98.2 F | BODY MASS INDEX: 25.69 KG/M2 | HEART RATE: 75 BPM | SYSTOLIC BLOOD PRESSURE: 126 MMHG | WEIGHT: 183.5 LBS | HEIGHT: 71 IN | DIASTOLIC BLOOD PRESSURE: 74 MMHG

## 2025-03-03 DIAGNOSIS — R07.9 CHEST PAIN, UNSPECIFIED TYPE: Primary | ICD-10-CM

## 2025-03-03 DIAGNOSIS — R13.10 DYSPHAGIA, UNSPECIFIED TYPE: ICD-10-CM

## 2025-03-03 DIAGNOSIS — K21.9 GASTROESOPHAGEAL REFLUX DISEASE WITHOUT ESOPHAGITIS: ICD-10-CM

## 2025-03-03 DIAGNOSIS — R79.89 ELEVATED LFTS: ICD-10-CM

## 2025-03-03 LAB
ALBUMIN SERPL BCG-MCNC: 4.2 G/DL (ref 3.5–5.2)
ALP SERPL-CCNC: 65 U/L (ref 40–150)
ALT SERPL W P-5'-P-CCNC: 17 U/L (ref 0–70)
ANION GAP SERPL CALCULATED.3IONS-SCNC: 9 MMOL/L (ref 7–15)
AST SERPL W P-5'-P-CCNC: 23 U/L (ref 0–45)
BASOPHILS # BLD AUTO: 0.1 10E3/UL (ref 0–0.2)
BASOPHILS NFR BLD AUTO: 1 %
BILIRUB SERPL-MCNC: 0.5 MG/DL
BUN SERPL-MCNC: 12.2 MG/DL (ref 8–23)
CALCIUM SERPL-MCNC: 9.6 MG/DL (ref 8.8–10.4)
CHLORIDE SERPL-SCNC: 107 MMOL/L (ref 98–107)
CREAT SERPL-MCNC: 0.91 MG/DL (ref 0.67–1.17)
EGFRCR SERPLBLD CKD-EPI 2021: >90 ML/MIN/1.73M2
EOSINOPHIL # BLD AUTO: 0.2 10E3/UL (ref 0–0.7)
EOSINOPHIL NFR BLD AUTO: 4 %
ERYTHROCYTE [DISTWIDTH] IN BLOOD BY AUTOMATED COUNT: 12.7 % (ref 10–15)
GLUCOSE SERPL-MCNC: 93 MG/DL (ref 70–99)
HCO3 SERPL-SCNC: 26 MMOL/L (ref 22–29)
HCT VFR BLD AUTO: 42.9 % (ref 40–53)
HGB BLD-MCNC: 14.5 G/DL (ref 13.3–17.7)
IMM GRANULOCYTES # BLD: 0 10E3/UL
IMM GRANULOCYTES NFR BLD: 0 %
LYMPHOCYTES # BLD AUTO: 0.3 10E3/UL (ref 0.8–5.3)
LYMPHOCYTES NFR BLD AUTO: 5 %
MCH RBC QN AUTO: 31 PG (ref 26.5–33)
MCHC RBC AUTO-ENTMCNC: 33.8 G/DL (ref 31.5–36.5)
MCV RBC AUTO: 92 FL (ref 78–100)
MONOCYTES # BLD AUTO: 0.5 10E3/UL (ref 0–1.3)
MONOCYTES NFR BLD AUTO: 9 %
NEUTROPHILS # BLD AUTO: 4.7 10E3/UL (ref 1.6–8.3)
NEUTROPHILS NFR BLD AUTO: 81 %
PLATELET # BLD AUTO: 242 10E3/UL (ref 150–450)
POTASSIUM SERPL-SCNC: 4.6 MMOL/L (ref 3.4–5.3)
PROT SERPL-MCNC: 6.8 G/DL (ref 6.4–8.3)
RBC # BLD AUTO: 4.67 10E6/UL (ref 4.4–5.9)
SODIUM SERPL-SCNC: 142 MMOL/L (ref 135–145)
WBC # BLD AUTO: 5.8 10E3/UL (ref 4–11)

## 2025-03-03 PROCEDURE — 3078F DIAST BP <80 MM HG: CPT | Performed by: FAMILY MEDICINE

## 2025-03-03 PROCEDURE — 36415 COLL VENOUS BLD VENIPUNCTURE: CPT | Performed by: FAMILY MEDICINE

## 2025-03-03 PROCEDURE — 3074F SYST BP LT 130 MM HG: CPT | Performed by: FAMILY MEDICINE

## 2025-03-03 PROCEDURE — 1126F AMNT PAIN NOTED NONE PRSNT: CPT | Performed by: FAMILY MEDICINE

## 2025-03-03 PROCEDURE — 99213 OFFICE O/P EST LOW 20 MIN: CPT | Performed by: FAMILY MEDICINE

## 2025-03-03 PROCEDURE — 85025 COMPLETE CBC W/AUTO DIFF WBC: CPT | Performed by: FAMILY MEDICINE

## 2025-03-03 PROCEDURE — 80053 COMPREHEN METABOLIC PANEL: CPT | Performed by: FAMILY MEDICINE

## 2025-03-03 ASSESSMENT — PAIN SCALES - GENERAL: PAINLEVEL_OUTOF10: NO PAIN (0)

## 2025-03-03 NOTE — PROGRESS NOTES
"Chirag Gomez  /74   Pulse 75   Temp 98.2  F (36.8  C) (Oral)   Resp 18   Ht 1.803 m (5' 11\")   Wt 83.2 kg (183 lb 8 oz)   SpO2 98%   BMI 25.59 kg/m       Assessment/Plan:                Trevin was seen today for hospital f/u and recheck medication.    Diagnoses and all orders for this visit:    Chest pain, unspecified type    Gastroesophageal reflux disease without esophagitis  -     CBC with Platelets & Differential; Future  -     Adult GI  Referral - Procedure Only; Future  -     CBC with Platelets & Differential    Dysphagia, unspecified type  -     Adult GI  Referral - Procedure Only; Future    Elevated LFTs  -     Comprehensive metabolic panel; Future  -     Comprehensive metabolic panel         DISCUSSION  Is symptom description sounds most likely to be a manifestation of dysphagia with possibly food getting stuck in the distal aspect of the esophagus.  We discussed options for workup including further consideration for cardiac workup versus proceeding with only endoscopy at this time.  Patient wishes to proceed with endoscopy and I am in agreement as I do not think he has any cardiac symptoms that need workup at this point.  We discussed extensively what types of symptoms would require such a workup if they do occur.  He should continue to take omeprazole.  He should report any symptom concerns to me and we will arrange the endoscopy.    We will recheck up on elevated LFTs and mild elevation in glucose.  Will also recheck blood counts given the symptom concerning.  Subjective:     HPI:    Chirag Gomez is a 69 year old male has a medical history that includes prediabetes, dyslipidemia, hiatal hernia and acid reflux.    On February 21, 2025 patient states that he left his home after having a small breakfast of coffee and toast to visit his brother was hospitalized in [place] after having a small stroke.  Patient states that after a short distance he began experiencing " rather significant discomfort in the lower chest/epigastric region of the abdomen.  This progressively built up over a short period of time so he returned home.  He notes that he was having significant drooling.  Paramedics were summoned he was brought to the hospital for evaluation.  Patient reports having an episode where food had gotten slightly stuck in the distal esophagus but always in the past and he never had such significant symptoms as this.  He has no history of any heart disease.  He does not take any prescription medications.  He has a low overall vascular disease risk.    In emergency department he had negative troponins no EKG change.  Laboratory testing showed elevated AST.  He also had slight elevation in his glucose likely a stress response.  There was a suggestion to consider follow-up at rapid access clinic with cardiology.  Patient elected not to follow through on this as he really does not feel it is cardiac in nature.    Patient reports he has been taking omeprazole.  He does not report significant acid reflux symptoms prior to the event or after.  He does not reporting any additional symptom concerns since the time of this emergency department visit.  He does inform me as I talk to him today that he has a strong family history of people who were treated with esophageal dilatation for dysphagia and symptoms of food getting stuck.  He had an endoscopy in 2023 but has not had any history of esophageal dilatation or constriction.    ROS:  Complete review of systems is obtained.  Other than the specific considerations noted above complete review of systems is negative.          Objective:   Medications:  Current Outpatient Medications   Medication Sig Dispense Refill    omeprazole 20 MG tablet Take 20 mg by mouth daily.       No current facility-administered medications for this visit.        Allergies:   No Known Allergies     Social History     Socioeconomic History    Marital status:       Spouse name: Not on file    Number of children: Not on file    Years of education: Not on file    Highest education level: Not on file   Occupational History    Not on file   Tobacco Use    Smoking status: Never     Passive exposure: Never    Smokeless tobacco: Never   Vaping Use    Vaping status: Never Used   Substance and Sexual Activity    Alcohol use: Yes     Alcohol/week: 12.0 standard drinks of alcohol    Drug use: No    Sexual activity: Not on file   Other Topics Concern    Not on file   Social History Narrative    Not on file     Social Drivers of Health     Financial Resource Strain: Low Risk  (6/12/2024)    Financial Resource Strain     Within the past 12 months, have you or your family members you live with been unable to get utilities (heat, electricity) when it was really needed?: No   Food Insecurity: Low Risk  (6/12/2024)    Food Insecurity     Within the past 12 months, did you worry that your food would run out before you got money to buy more?: No     Within the past 12 months, did the food you bought just not last and you didn t have money to get more?: No   Transportation Needs: Low Risk  (6/12/2024)    Transportation Needs     Within the past 12 months, has lack of transportation kept you from medical appointments, getting your medicines, non-medical meetings or appointments, work, or from getting things that you need?: No   Physical Activity: Sufficiently Active (6/12/2024)    Exercise Vital Sign     Days of Exercise per Week: 5 days     Minutes of Exercise per Session: 60 min   Stress: No Stress Concern Present (6/12/2024)    [employer] - Occupational Stress Questionnaire     Feeling of Stress : Only a little   Social Connections: Unknown (6/12/2024)    Social Connection and Isolation Panel [NHANES]     Frequency of Communication with Friends and Family: Not on file     Frequency of Social Gatherings with Friends and Family: Twice a week     Attends Tenriism  Services: Not on file     Active Member of Clubs or Organizations: Not on file     Attends Club or Organization Meetings: Not on file     Marital Status: Not on file   Interpersonal Safety: Low Risk  (6/17/2024)    Interpersonal Safety     Do you feel physically and emotionally safe where you currently live?: Yes     Within the past 12 months, have you been hit, slapped, kicked or otherwise physically hurt by someone?: No     Within the past 12 months, have you been humiliated or emotionally abused in other ways by your partner or ex-partner?: No   Housing Stability: Low Risk  (6/12/2024)    Housing Stability     Do you have housing? : Yes     Are you worried about losing your housing?: No       No family history on file.     Most Recent Immunizations   Administered Date(s) Administered    COVID-19 12+ (Pfizer) 10/07/2024    COVID-19 Bivalent 18+ (Moderna) 07/25/2023    COVID-19 Monovalent 18+ (Moderna) 11/02/2021    COVID-19 Monovalent Booster 18+ (Moderna) 05/03/2022    DT (PEDS <7y) 01/18/2006    Flu, Unspecified 10/01/2017    Influenza (High Dose) Trivalent,PF (Fluzone) 10/07/2024    Influenza (IIV3) PF 11/29/2012    Influenza Vaccine 18-64 (Flublok) 10/08/2019    Influenza Vaccine 65+ (Fluzone HD) 10/02/2023    Influenza Vaccine, 6+MO IM (QUADRIVALENT W/PRESERVATIVES) 10/02/2018    Pneumo Conj 13-V (2010&after) 02/11/2020    Pneumococcal 23 valent 04/19/2021    TD,PF 7+ (Tenivac) 01/18/2006    TDAP (Adacel,Boostrix) 05/03/2022    Td,adult,historic,unspecified 01/18/2006    Zoster recombinant adjuvanted (SHINGRIX) 04/19/2021        Wt Readings from Last 3 Encounters:   03/03/25 83.2 kg (183 lb 8 oz)   02/21/25 81.2 kg (179 lb)   12/06/24 81.2 kg (179 lb)        BP Readings from Last 6 Encounters:   03/03/25 126/74   02/21/25 (!) 143/90   12/06/24 134/76   10/07/24 136/88   06/17/24 130/78   07/25/23 128/76        Hemoglobin A1C   Date Value Ref Range Status   06/17/2024 5.7 (H) 0.0 - 5.6 % Final     Comment:      "Normal <5.7%   Prediabetes 5.7-6.4%    Diabetes 6.5% or higher     Note: Adopted from ADA consensus guidelines.   07/25/2023 5.6 0.0 - 5.6 % Final     Comment:     Normal <5.7%   Prediabetes 5.7-6.4%    Diabetes 6.5% or higher     Note: Adopted from ADA consensus guidelines.   05/03/2022 5.6 0.0 - 5.6 % Final     Comment:     Normal <5.7%   Prediabetes 5.7-6.4%    Diabetes 6.5% or higher     Note: Adopted from ADA consensus guidelines.              PHYSICAL EXAM:    /74   Pulse 75   Temp 98.2  F (36.8  C) (Oral)   Resp 18   Ht 1.803 m (5' 11\")   Wt 83.2 kg (183 lb 8 oz)   SpO2 98%   BMI 25.59 kg/m           General Appearance:    Alert, cooperative, no distress   Eyes:   No scleral icterus or conjunctival irritation       Lungs:     Clear to auscultation bilaterally, respirations unlabored, no wheezes or crackles   Heart:    Regular rate and rhythm,  No murmur   Abdomen:    Soft, no distention, no tenderness on palpation, no masses, no organomegaly     Extremities:  No edema, no joint swelling or redness, no evidence of any injuries   Skin:  No concerning skin findings, no suspicious moles, no rashes   Neurologic:  On gross examination there is no motor or sensory deficit.  Patient walks with a normal gait                                     "

## 2025-03-07 ENCOUNTER — TRANSFERRED RECORDS (OUTPATIENT)
Dept: HEALTH INFORMATION MANAGEMENT | Facility: CLINIC | Age: 70
End: 2025-03-07
Payer: COMMERCIAL

## 2025-03-27 ENCOUNTER — TRANSFERRED RECORDS (OUTPATIENT)
Dept: HEALTH INFORMATION MANAGEMENT | Facility: CLINIC | Age: 70
End: 2025-03-27
Payer: COMMERCIAL

## 2025-04-06 ENCOUNTER — HEALTH MAINTENANCE LETTER (OUTPATIENT)
Age: 70
End: 2025-04-06

## 2025-05-22 SDOH — HEALTH STABILITY: PHYSICAL HEALTH: ON AVERAGE, HOW MANY DAYS PER WEEK DO YOU ENGAGE IN MODERATE TO STRENUOUS EXERCISE (LIKE A BRISK WALK)?: 5 DAYS

## 2025-05-22 SDOH — HEALTH STABILITY: PHYSICAL HEALTH: ON AVERAGE, HOW MANY MINUTES DO YOU ENGAGE IN EXERCISE AT THIS LEVEL?: 40 MIN

## 2025-05-22 ASSESSMENT — SOCIAL DETERMINANTS OF HEALTH (SDOH): HOW OFTEN DO YOU GET TOGETHER WITH FRIENDS OR RELATIVES?: TWICE A WEEK

## 2025-05-27 ENCOUNTER — OFFICE VISIT (OUTPATIENT)
Dept: FAMILY MEDICINE | Facility: CLINIC | Age: 70
End: 2025-05-27
Payer: COMMERCIAL

## 2025-05-27 VITALS
TEMPERATURE: 98 F | HEIGHT: 71 IN | WEIGHT: 182 LBS | HEART RATE: 67 BPM | SYSTOLIC BLOOD PRESSURE: 124 MMHG | BODY MASS INDEX: 25.48 KG/M2 | RESPIRATION RATE: 20 BRPM | OXYGEN SATURATION: 98 % | DIASTOLIC BLOOD PRESSURE: 74 MMHG

## 2025-05-27 DIAGNOSIS — K44.9 HIATAL HERNIA: ICD-10-CM

## 2025-05-27 DIAGNOSIS — Z00.00 MEDICARE ANNUAL WELLNESS VISIT, SUBSEQUENT: Primary | ICD-10-CM

## 2025-05-27 DIAGNOSIS — R73.03 PREDIABETES: ICD-10-CM

## 2025-05-27 DIAGNOSIS — E78.5 DYSLIPIDEMIA: ICD-10-CM

## 2025-05-27 DIAGNOSIS — K21.9 GASTROESOPHAGEAL REFLUX DISEASE WITHOUT ESOPHAGITIS: ICD-10-CM

## 2025-05-27 DIAGNOSIS — Z12.5 SCREENING FOR PROSTATE CANCER: ICD-10-CM

## 2025-05-27 LAB
ALBUMIN SERPL BCG-MCNC: 4.2 G/DL (ref 3.5–5.2)
ALP SERPL-CCNC: 55 U/L (ref 40–150)
ALT SERPL W P-5'-P-CCNC: 17 U/L (ref 0–70)
ANION GAP SERPL CALCULATED.3IONS-SCNC: 10 MMOL/L (ref 7–15)
AST SERPL W P-5'-P-CCNC: 19 U/L (ref 0–45)
BASOPHILS # BLD AUTO: 0.1 10E3/UL (ref 0–0.2)
BASOPHILS NFR BLD AUTO: 1 %
BILIRUB SERPL-MCNC: 0.6 MG/DL
BUN SERPL-MCNC: 13.8 MG/DL (ref 8–23)
CALCIUM SERPL-MCNC: 9.3 MG/DL (ref 8.8–10.4)
CHLORIDE SERPL-SCNC: 109 MMOL/L (ref 98–107)
CHOLEST SERPL-MCNC: 170 MG/DL
CREAT SERPL-MCNC: 1.01 MG/DL (ref 0.67–1.17)
EGFRCR SERPLBLD CKD-EPI 2021: 80 ML/MIN/1.73M2
EOSINOPHIL # BLD AUTO: 0.3 10E3/UL (ref 0–0.7)
EOSINOPHIL NFR BLD AUTO: 7 %
ERYTHROCYTE [DISTWIDTH] IN BLOOD BY AUTOMATED COUNT: 13 % (ref 10–15)
EST. AVERAGE GLUCOSE BLD GHB EST-MCNC: 111 MG/DL
FASTING STATUS PATIENT QL REPORTED: YES
FASTING STATUS PATIENT QL REPORTED: YES
GLUCOSE SERPL-MCNC: 104 MG/DL (ref 70–99)
HBA1C MFR BLD: 5.5 % (ref 0–5.6)
HCO3 SERPL-SCNC: 27 MMOL/L (ref 22–29)
HCT VFR BLD AUTO: 43.1 % (ref 40–53)
HDLC SERPL-MCNC: 58 MG/DL
HGB BLD-MCNC: 14.5 G/DL (ref 13.3–17.7)
IMM GRANULOCYTES # BLD: 0 10E3/UL
IMM GRANULOCYTES NFR BLD: 0 %
LDLC SERPL CALC-MCNC: 100 MG/DL
LYMPHOCYTES # BLD AUTO: 0.4 10E3/UL (ref 0.8–5.3)
LYMPHOCYTES NFR BLD AUTO: 9 %
MCH RBC QN AUTO: 31.5 PG (ref 26.5–33)
MCHC RBC AUTO-ENTMCNC: 33.6 G/DL (ref 31.5–36.5)
MCV RBC AUTO: 94 FL (ref 78–100)
MONOCYTES # BLD AUTO: 0.4 10E3/UL (ref 0–1.3)
MONOCYTES NFR BLD AUTO: 10 %
NEUTROPHILS # BLD AUTO: 3.3 10E3/UL (ref 1.6–8.3)
NEUTROPHILS NFR BLD AUTO: 73 %
NONHDLC SERPL-MCNC: 112 MG/DL
PLATELET # BLD AUTO: 237 10E3/UL (ref 150–450)
POTASSIUM SERPL-SCNC: 5 MMOL/L (ref 3.4–5.3)
PROT SERPL-MCNC: 6.4 G/DL (ref 6.4–8.3)
PSA SERPL DL<=0.01 NG/ML-MCNC: 0.46 NG/ML (ref 0–6.5)
RBC # BLD AUTO: 4.61 10E6/UL (ref 4.4–5.9)
SODIUM SERPL-SCNC: 146 MMOL/L (ref 135–145)
TRIGL SERPL-MCNC: 59 MG/DL
WBC # BLD AUTO: 4.5 10E3/UL (ref 4–11)

## 2025-05-27 PROCEDURE — 85025 COMPLETE CBC W/AUTO DIFF WBC: CPT | Performed by: FAMILY MEDICINE

## 2025-05-27 PROCEDURE — 36415 COLL VENOUS BLD VENIPUNCTURE: CPT | Performed by: FAMILY MEDICINE

## 2025-05-27 PROCEDURE — 80061 LIPID PANEL: CPT | Performed by: FAMILY MEDICINE

## 2025-05-27 PROCEDURE — 83036 HEMOGLOBIN GLYCOSYLATED A1C: CPT | Performed by: FAMILY MEDICINE

## 2025-05-27 PROCEDURE — G0103 PSA SCREENING: HCPCS | Performed by: FAMILY MEDICINE

## 2025-05-27 PROCEDURE — 80053 COMPREHEN METABOLIC PANEL: CPT | Performed by: FAMILY MEDICINE

## 2025-05-27 ASSESSMENT — ACTIVITIES OF DAILY LIVING (ADL)
WEAR_GLASSES_OR_BLIND: YES
DIFFICULTY_COMMUNICATING: NO
CHANGE_IN_FUNCTIONAL_STATUS_SINCE_ONSET_OF_CURRENT_ILLNESS/INJURY: NO
HEARING_DIFFICULTY_OR_DEAF: NO
DIFFICULTY_EATING/SWALLOWING: NO
CONCENTRATING,_REMEMBERING_OR_MAKING_DECISIONS_DIFFICULTY: NO
FALL_HISTORY_WITHIN_LAST_SIX_MONTHS: NO
WALKING_OR_CLIMBING_STAIRS_DIFFICULTY: NO
DOING_ERRANDS_INDEPENDENTLY_DIFFICULTY: NO
TOILETING_ISSUES: NO
DRESSING/BATHING_DIFFICULTY: NO

## 2025-05-27 ASSESSMENT — PAIN SCALES - GENERAL: PAINLEVEL_OUTOF10: NO PAIN (0)

## 2025-05-27 NOTE — PROGRESS NOTES
Preventive Care Visit  [facility]  George Oliver MD, MD, Family Medicine  May 27, 2025      PHQ 2 Score: 0/2.    Minico/2, 100% Words. - updated post-visit - A.MARTINEZ Zhong was seen today for recheck medication and wellness visit.    Diagnoses and all orders for this visit:    Medicare annual wellness visit, subsequent    Gastroesophageal reflux disease without esophagitis  -     Comprehensive metabolic panel (BMP + Alb, Alk Phos, ALT, AST, Total. Bili, TP); Future  -     CBC with Platelets & Differential; Future    Dyslipidemia  -     Lipid panel reflex to direct LDL Fasting; Future  -     Comprehensive metabolic panel (BMP + Alb, Alk Phos, ALT, AST, Total. Bili, TP); Future    Prediabetes  -     Comprehensive metabolic panel (BMP + Alb, Alk Phos, ALT, AST, Total. Bili, TP); Future  -     Hemoglobin A1c; Future    Hiatal hernia  -     CBC with Platelets & Differential; Future    Screening for prostate cancer  -     PSA, screen; Future    Other orders  -     COVID-19 12+ (PFIZER)           Subjective   Trevin is a 69 year old, presenting for the following:  Recheck Medication and Wellness Visit      He is a healthy 69-year-old male.  Has acid reflux and has dealt with some more significant concerns related to this this past year including dysphagia and severe chest pain.  He had an endoscopy in March.  Was recommended he take twice daily omeprazole which she has continued to do.  He reports good control of symptoms currently.  We discussed finishing the next 2 months of twice daily omeprazole and then switching to once daily.  In 6 months we will look at tapering down to 20 mg once daily.  He will report any symptom concerns to me.  No red flag symptoms.    He has dyslipidemia with a vascular disease risk calculated score of about 15.  We discussed this today.  We discussed different courses of action that we can take from continuing without medication, starting medication to lower risk  versus further assessment.  We elected to obtain repeat cholesterol numbers and then consider coronary calcium score.    He has a history of prediabetes with most recent A1c  readings within the upper 5 range.  Tries to work hard to keep sugar out of his diet and stay active.    Discussed continued prostate cancer screening.  Reviewed colon cancer screening.  Discussed immunization update.    Do you have a current opioid prescription? no  Do you use any other controlled substances or medications that are not prescribed by a provider?  no          Advance Care Planning    Document on file is a Health Care Directive or POLST.        5/22/2025   General Health   How would you rate your overall physical health? Good   Feel stress (tense, anxious, or unable to sleep) Only a little   (!) STRESS CONCERN      5/22/2025   Nutrition   Diet: Regular (no restrictions)         5/22/2025   Exercise   Days per week of moderate/strenous exercise 5 days   Average minutes spent exercising at this level 40 min         5/22/2025   Social Factors   Frequency of gathering with friends or relatives Twice a week   Worry food won't last until get money to buy more No   Food not last or not have enough money for food? No   Do you have housing? (Housing is defined as stable permanent housing and does not include staying outside in a car, in a tent, in an abandoned building, in an overnight shelter, or couch-surfing.) Yes   Are you worried about losing your housing? No   Lack of transportation? No   Unable to get utilities (heat,electricity)? No         5/27/2025   Fall Risk   Fallen 2 or more times in the past year? No   Trouble with walking or balance? No   Gait Speed Test Interpretation Less than or equal to 5.00 seconds - PASS          5/22/2025   Activities of Daily Living- Home Safety   Needs help with the following daily activites None of the above   Safety concerns in the home None of the above         5/22/2025   Dental   Dentist two  times every year? (!) NO         5/22/2025   Hearing Screening   Hearing concerns? None of the above         5/22/2025   Driving Risk Screening   Patient/family members have concerns about driving No         5/22/2025   General Alertness/Fatigue Screening   Have you been more tired than usual lately? (!) YES         5/22/2025   Urinary Incontinence Screening   Bothered by leaking urine in past 6 months No               5/22/2025   Substance Use   Alcohol more than 3/day or more than 7/wk No   Do you have a current opioid prescription? No   How severe/bad is pain from 1 to 10? 2/10   Do you use any other substances recreationally? (!) BATH SALTS    (!) OTHER       Multiple values from one day are sorted in reverse-chronological order     Social History     Tobacco Use    Smoking status: Never     Passive exposure: Never    Smokeless tobacco: Never   Vaping Use    Vaping status: Never Used   Substance Use Topics    Alcohol use: Yes     Alcohol/week: 12.0 standard drinks of alcohol    Drug use: No           5/22/2025   AAA Screening   Family history of Abdominal Aortic Aneurysm (AAA)? No   ASCVD Risk   The 10-year ASCVD risk score (Ramu DOSS, et al., 2019) is: 15.7%    Values used to calculate the score:      Age: 69 years      Sex: Male      Is Non- : No      Diabetic: No      Tobacco smoker: No      Systolic Blood Pressure: 124 mmHg      Is BP treated: No      HDL Cholesterol: 53 mg/dL      Total Cholesterol: 175 mg/dL            Reviewed and updated as needed this visit by Provider                      Current providers sharing in care for this patient include:  Patient Care Team:  George Oliver MD as PCP - General (Family Practice)  George Oliver MD as Assigned PCP    The following health maintenance items are reviewed in Epic and correct as of today:  Health Maintenance   Topic Date Due    ANNUAL REVIEW OF HM ORDERS  05/03/2023    COVID-19 Vaccine (8 - 2024-25 season)  "04/07/2025    MEDICARE ANNUAL WELLNESS VISIT  06/17/2025    FALL RISK ASSESSMENT  05/27/2026    COLORECTAL CANCER SCREENING  03/29/2027    DIABETES SCREENING  03/03/2028    LIPID  06/17/2029    RSV VACCINE (1 - 1-dose 75+ series) 11/21/2029    ADVANCE CARE PLANNING  05/27/2030    DTAP/TDAP/TD IMMUNIZATION (3 - Td or Tdap) 05/03/2032    HEPATITIS C SCREENING  Completed    PHQ-2 (once per calendar year)  Completed    INFLUENZA VACCINE  Completed    Pneumococcal Vaccine: 50+ Years  Completed    ZOSTER IMMUNIZATION  Addressed    HPV IMMUNIZATION  Aged Out    MENINGITIS IMMUNIZATION  Aged Out       Complete review of systems is obtained.  Other than the specific considerations noted above complete review of systems is negative.       Objective    Exam  /74   Pulse 67   Temp 98  F (36.7  C)   Resp 20   Ht 1.803 m (5' 11\")   Wt 82.6 kg (182 lb)   SpO2 98%   BMI 25.38 kg/m     Estimated body mass index is 25.38 kg/m  as calculated from the following:    Height as of this encounter: 1.803 m (5' 11\").    Weight as of this encounter: 82.6 kg (182 lb).    Physical Exam        General Appearance:    Alert, cooperative, no distress   Eyes:   No scleral icterus or conjunctival irritation       Ears:    Normal TM's and external ear canals, both ears   Throat:   Lips, mucosa, and tongue normal; teeth and gums normal   Neck:   Supple, symmetrical, trachea midline, no adenopathy;        thyroid:  No enlargement/tenderness/nodules   Lungs:     Clear to auscultation bilaterally, respirations unlabored, no wheezes or crackles   Heart:    Regular rate and rhythm,  No murmur   Abdomen:    Soft, no distention, no tenderness on palpation, no masses, no organomegaly     Extremities:  No edema, no joint swelling or redness, no evidence of any injuries   Skin:  No concerning skin findings, no suspicious moles, no rashes   Neurologic:  On gross examination there is no motor or sensory deficit.  Patient walks with a normal gait "               5/27/2025   Mini Cog   Clock Draw Score 2 Normal    2 Normal   3 Item Recall 3 objects recalled    3 objects recalled   Mini Cog Total Score 5    5       Multiple values from one day are sorted in reverse-chronological order                Wt Readings from Last 3 Encounters:   05/27/25 82.6 kg (182 lb)   03/03/25 83.2 kg (183 lb 8 oz)   02/21/25 81.2 kg (179 lb)        BP Readings from Last 6 Encounters:   05/27/25 124/74   03/03/25 126/74   02/21/25 (!) 143/90   12/06/24 134/76   10/07/24 136/88   06/17/24 130/78        Hemoglobin A1C   Date Value Ref Range Status   06/17/2024 5.7 (H) 0.0 - 5.6 % Final     Comment:     Normal <5.7%   Prediabetes 5.7-6.4%    Diabetes 6.5% or higher     Note: Adopted from ADA consensus guidelines.   07/25/2023 5.6 0.0 - 5.6 % Final     Comment:     Normal <5.7%   Prediabetes 5.7-6.4%    Diabetes 6.5% or higher     Note: Adopted from ADA consensus guidelines.   05/03/2022 5.6 0.0 - 5.6 % Final     Comment:     Normal <5.7%   Prediabetes 5.7-6.4%    Diabetes 6.5% or higher     Note: Adopted from ADA consensus guidelines.            Signed Electronically by: George Oliver MD

## 2025-05-28 ENCOUNTER — RESULTS FOLLOW-UP (OUTPATIENT)
Dept: FAMILY MEDICINE | Facility: CLINIC | Age: 70
End: 2025-05-28
Payer: COMMERCIAL

## 2025-05-28 DIAGNOSIS — E78.5 DYSLIPIDEMIA: Primary | ICD-10-CM

## 2025-05-28 NOTE — LETTER
May 29, 2025      Trevin Gomez  [address] [facility] [address]        Dear ,    The cholesterol remains mildly elevated.  The numbers are relatively stable in comparison to previous.  As we discussed based on factors including age and gender your risk for developing vascular disease is high enough to warrant the use of medication or to consider further evaluation with a CT coronary calcium score that we discussed.  Let me know how you would like to proceed.  If you want to start medication we can if you want to get more information from the coronary calcium CT score before we make any decision we can do that as well.     The electrolytes, liver function and kidney function measurements are all within an acceptable range.  The sodium level in the blood is slightly elevated. The chloride level is also slightly above normal.  Likely you were mildly dehydrated at the time of the test causing these mild abnormalities.  The sodium level and the chloride level will also correct on their own without any intervention.     The blood sugar, called glucose is slightly higher than ideal.  A follow-up test called hemoglobin A1c indicates that your blood sugar on average is normal and you do not have diabetes.  Continue to work hard on a healthy balanced diet that is overall low in sugar and get regular exercise.     The blood count measurements are all within acceptable range.  The absolute lymphocytes remain slightly below normal but I think this is what is normal for you.  Other measurements that involve lymphocytes including the percentage of lymphocytes are normal.  There is nothing to worry about we will monitor this over time.     The PSA test for prostate cancer screening remains well within the normal range.  The current number is less than previous numbers which gives great reassurance that there is not any problem with the prostate such as prostate cancer.  We should continue to check  yearly.  PSA, screen; Lipid panel reflex to direct LDL Fasting; Comprehensive metabolic panel (BMP + Alb, Al    Resulted Orders   Lipid panel reflex to direct LDL Fasting   Result Value Ref Range    Cholesterol 170 <200 mg/dL    Triglycerides 59 <150 mg/dL    Direct Measure HDL 58 >=40 mg/dL    LDL Cholesterol Calculated 100 (H) <100 mg/dL    Non HDL Cholesterol 112 <130 mg/dL    Patient Fasting > 8hrs? Yes     Narrative    Cholesterol  Desirable: < 200 mg/dL  Borderline High: 200 - 239 mg/dL  High: >= 240 mg/dL    Triglycerides  Normal: < 150 mg/dL  Borderline High: 150 - 199 mg/dL  High: 200-499 mg/dL  Very High: >= 500 mg/dL    Direct Measure HDL  Female: >= 50 mg/dL   Male: >= 40 mg/dL    LDL Cholesterol  Desirable: < 100 mg/dL  Above Desirable: 100 - 129 mg/dL   Borderline High: 130 - 159 mg/dL   High:  160 - 189 mg/dL   Very High: >= 190 mg/dL    Non HDL Cholesterol  Desirable: < 130 mg/dL  Above Desirable: 130 - 159 mg/dL  Borderline High: 160 - 189 mg/dL  High: 190 - 219 mg/dL  Very High: >= 220 mg/dL   Comprehensive metabolic panel (BMP + Alb, Alk Phos, ALT, AST, Total. Bili, TP)   Result Value Ref Range    Sodium 146 (H) 135 - 145 mmol/L    Potassium 5.0 3.4 - 5.3 mmol/L    Carbon Dioxide (CO2) 27 22 - 29 mmol/L    Anion Gap 10 7 - 15 mmol/L    Urea Nitrogen 13.8 8.0 - 23.0 mg/dL    Creatinine 1.01 0.67 - 1.17 mg/dL    GFR Estimate 80 >60 mL/min/1.73m2      Comment:      eGFR calculated using 2021 CKD-EPI equation.    Calcium 9.3 8.8 - 10.4 mg/dL    Chloride 109 (H) 98 - 107 mmol/L    Glucose 104 (H) 70 - 99 mg/dL    Alkaline Phosphatase 55 40 - 150 U/L    AST 19 0 - 45 U/L    ALT 17 0 - 70 U/L    Protein Total 6.4 6.4 - 8.3 g/dL    Albumin 4.2 3.5 - 5.2 g/dL    Bilirubin Total 0.6 <=1.2 mg/dL    Patient Fasting > 8hrs? Yes    PSA, screen   Result Value Ref Range    Prostate Specific Antigen Screen 0.46 0.00 - 6.50 ng/mL    Narrative    This result is obtained using the Roche Elecsys total PSA method on  the ludwin e801 immunoassay analyzer, which is an ultrasensitive method. Results obtained with different assay methods or kits cannot be used interchangeably.  This test is intended for initial prostate cancer screening. PSA values exceeding the age-specific limits are suspicious for prostate disease, but additional testing, such as prostate biopsy, is needed to diagnose prostate pathology. The American Cancer Society recommends annual examination with digital rectal examination and serum PSA beginning at age 50 and for men with a life expectancy of at least 10 years after detection of prostate cancer. For men in high-risk groups, such as  Americans or men with a first-degree relative diagnosed at a younger age, testing should begin at a younger age. It is generally recommended that information be provided to patients about the benefits and limitations of testing and treatment so they can make informed decisions.   Hemoglobin A1c   Result Value Ref Range    Estimated Average Glucose 111 <117 mg/dL    Hemoglobin A1C 5.5 0.0 - 5.6 %      Comment:      Normal <5.7%   Prediabetes 5.7-6.4%    Diabetes 6.5% or higher     Note: Adopted from ADA consensus guidelines.   CBC with platelets and differential   Result Value Ref Range    WBC Count 4.5 4.0 - 11.0 10e3/uL    RBC Count 4.61 4.40 - 5.90 10e6/uL    Hemoglobin 14.5 13.3 - 17.7 g/dL    Hematocrit 43.1 40.0 - 53.0 %    MCV 94 78 - 100 fL    MCH 31.5 26.5 - 33.0 pg    MCHC 33.6 31.5 - 36.5 g/dL    RDW 13.0 10.0 - 15.0 %    Platelet Count 237 150 - 450 10e3/uL    % Neutrophils 73 %    % Lymphocytes 9 %    % Monocytes 10 %    % Eosinophils 7 %    % Basophils 1 %    % Immature Granulocytes 0 %    Absolute Neutrophils 3.3 1.6 - 8.3 10e3/uL    Absolute Lymphocytes 0.4 (L) 0.8 - 5.3 10e3/uL    Absolute Monocytes 0.4 0.0 - 1.3 10e3/uL    Absolute Eosinophils 0.3 0.0 - 0.7 10e3/uL    Absolute Basophils 0.1 0.0 - 0.2 10e3/uL    Absolute Immature Granulocytes 0.0 <=0.4  10e3/uL       If you have any questions or concerns, please call the clinic at the number listed above.       Sincerely,      George Oliver MD    Electronically signed

## 2025-06-12 ENCOUNTER — RESULTS FOLLOW-UP (OUTPATIENT)
Dept: FAMILY MEDICINE | Facility: CLINIC | Age: 70
End: 2025-06-12

## 2025-06-12 ENCOUNTER — HOSPITAL ENCOUNTER (OUTPATIENT)
Dept: CT IMAGING | Facility: CLINIC | Age: 70
End: 2025-06-12
Attending: FAMILY MEDICINE
Payer: COMMERCIAL

## 2025-06-12 DIAGNOSIS — E78.5 DYSLIPIDEMIA: ICD-10-CM

## 2025-06-12 DIAGNOSIS — E78.5 DYSLIPIDEMIA: Primary | ICD-10-CM

## 2025-06-12 LAB
CV CALCIUM SCORE AGATSTON LM: 0
CV CALCIUM SCORING AGATSON LAD: 259
CV CALCIUM SCORING AGATSTON CX: 10
CV CALCIUM SCORING AGATSTON RCA: 30
CV CALCIUM SCORING AGATSTON TOTAL: 299

## 2025-06-12 PROCEDURE — 75571 CT HRT W/O DYE W/CA TEST: CPT

## 2025-06-15 RX ORDER — ROSUVASTATIN CALCIUM 10 MG/1
10 TABLET, COATED ORAL DAILY
Qty: 90 TABLET | Refills: 3 | Status: SHIPPED | OUTPATIENT
Start: 2025-06-15